# Patient Record
Sex: FEMALE | Race: BLACK OR AFRICAN AMERICAN | NOT HISPANIC OR LATINO | Employment: FULL TIME | URBAN - METROPOLITAN AREA
[De-identification: names, ages, dates, MRNs, and addresses within clinical notes are randomized per-mention and may not be internally consistent; named-entity substitution may affect disease eponyms.]

---

## 2023-03-29 DIAGNOSIS — L30.9 DERMATITIS: ICD-10-CM

## 2023-03-29 DIAGNOSIS — M25.519 SHOULDER PAIN, UNSPECIFIED CHRONICITY, UNSPECIFIED LATERALITY: Primary | ICD-10-CM

## 2023-03-29 DIAGNOSIS — E55.9 VITAMIN D DEFICIENCY: ICD-10-CM

## 2023-03-30 RX ORDER — IBUPROFEN 800 MG/1
TABLET ORAL
Qty: 90 TABLET | Refills: 0 | Status: SHIPPED | OUTPATIENT
Start: 2023-03-30

## 2023-03-30 RX ORDER — OMEGA-3S/DHA/EPA/FISH OIL/D3 300MG-1000
CAPSULE ORAL
Qty: 60 TABLET | Refills: 1 | Status: SHIPPED | OUTPATIENT
Start: 2023-03-30

## 2023-03-30 RX ORDER — DESONIDE 0.5 MG/G
OINTMENT TOPICAL
Qty: 15 G | Refills: 2 | Status: SHIPPED | OUTPATIENT
Start: 2023-03-30

## 2023-09-18 PROBLEM — T78.40XA ALLERGIC REACTION: Status: ACTIVE | Noted: 2023-09-18

## 2023-09-18 PROBLEM — M62.89 PELVIC FLOOR DYSFUNCTION: Status: ACTIVE | Noted: 2023-09-18

## 2023-09-18 PROBLEM — R39.9 UTI SYMPTOMS: Status: ACTIVE | Noted: 2023-09-18

## 2023-09-18 PROBLEM — L91.8 OTHER HYPERTROPHIC DISORDERS OF THE SKIN: Status: ACTIVE | Noted: 2022-05-19

## 2023-09-18 PROBLEM — S99.929A FOOT INJURY: Status: ACTIVE | Noted: 2023-09-18

## 2023-09-18 PROBLEM — K21.9 ACID REFLUX DISEASE: Status: ACTIVE | Noted: 2023-09-18

## 2023-09-18 PROBLEM — M77.8 TENDONITIS OF ELBOW, LEFT: Status: ACTIVE | Noted: 2023-09-18

## 2023-09-18 PROBLEM — M54.16 LUMBAR RADICULOPATHY, CHRONIC: Status: ACTIVE | Noted: 2023-09-18

## 2023-09-18 PROBLEM — M21.41 FLAT FEET, BILATERAL: Status: ACTIVE | Noted: 2023-09-18

## 2023-09-18 PROBLEM — R60.9 PERIPHERAL EDEMA: Status: ACTIVE | Noted: 2023-09-18

## 2023-09-18 PROBLEM — M17.0 OSTEOARTHRITIS OF KNEES, BILATERAL: Status: ACTIVE | Noted: 2023-09-18

## 2023-09-18 PROBLEM — L03.90 CELLULITIS: Status: ACTIVE | Noted: 2023-09-18

## 2023-09-18 PROBLEM — M17.12 ARTHRITIS OF KNEE, LEFT: Status: ACTIVE | Noted: 2023-09-18

## 2023-09-18 PROBLEM — S63.259A FINGER DISLOCATION: Status: ACTIVE | Noted: 2023-09-18

## 2023-09-18 PROBLEM — R60.0 PERIPHERAL EDEMA: Status: ACTIVE | Noted: 2023-09-18

## 2023-09-18 PROBLEM — T78.3XXS: Status: ACTIVE | Noted: 2023-09-18

## 2023-09-18 PROBLEM — H52.13 MYOPIA OF BOTH EYES: Status: ACTIVE | Noted: 2023-09-18

## 2023-09-18 PROBLEM — M21.42 FLAT FEET, BILATERAL: Status: ACTIVE | Noted: 2023-09-18

## 2023-09-18 PROBLEM — R11.0 NAUSEA IN ADULT: Status: ACTIVE | Noted: 2023-09-18

## 2023-09-18 PROBLEM — N81.6 RECTOCELE, FEMALE: Status: ACTIVE | Noted: 2023-09-18

## 2023-09-18 PROBLEM — F43.10 PTSD (POST-TRAUMATIC STRESS DISORDER): Status: ACTIVE | Noted: 2023-09-18

## 2023-09-18 PROBLEM — M76.62 ACHILLES TENDINITIS OF LEFT LOWER EXTREMITY: Status: ACTIVE | Noted: 2023-09-18

## 2023-09-18 PROBLEM — F41.9 ANXIETY: Status: ACTIVE | Noted: 2023-09-18

## 2023-09-18 PROBLEM — K64.4 EXTERNAL HEMORRHOID: Status: ACTIVE | Noted: 2023-09-18

## 2023-09-18 PROBLEM — R22.30: Status: ACTIVE | Noted: 2023-09-18

## 2023-09-18 PROBLEM — D48.5 NEOPLASM OF UNCERTAIN BEHAVIOR OF SKIN OF EYELID: Status: ACTIVE | Noted: 2022-05-19

## 2023-09-18 PROBLEM — R35.0 URINE FREQUENCY: Status: ACTIVE | Noted: 2023-09-18

## 2023-09-18 PROBLEM — R10.2 PELVIC PAIN IN FEMALE: Status: ACTIVE | Noted: 2023-09-18

## 2023-09-18 PROBLEM — M99.03 SEGMENTAL AND SOMATIC DYSFUNCTION OF LUMBAR REGION: Status: ACTIVE | Noted: 2023-09-18

## 2023-09-18 PROBLEM — E66.01 MORBID OBESITY (MULTI): Status: ACTIVE | Noted: 2023-09-18

## 2023-09-18 PROBLEM — M41.9 SCOLIOSIS: Status: ACTIVE | Noted: 2023-09-18

## 2023-09-18 PROBLEM — M54.17 LUMBOSACRAL RADICULITIS: Status: ACTIVE | Noted: 2023-09-18

## 2023-09-18 PROBLEM — N81.89 PELVIC FLOOR WEAKNESS: Status: ACTIVE | Noted: 2023-09-18

## 2023-09-18 PROBLEM — M17.30 POST-TRAUMATIC OSTEOARTHRITIS OF ONE KNEE: Status: ACTIVE | Noted: 2023-09-18

## 2023-09-18 PROBLEM — H25.813 COMBINED FORM OF SENILE CATARACT OF BOTH EYES: Status: ACTIVE | Noted: 2023-09-18

## 2023-09-18 PROBLEM — R32 URINARY INCONTINENCE: Status: ACTIVE | Noted: 2023-09-18

## 2023-09-18 PROBLEM — E55.9 VITAMIN D DEFICIENCY: Status: ACTIVE | Noted: 2023-09-18

## 2023-09-18 PROBLEM — M75.81 TENDINITIS OF RIGHT ROTATOR CUFF: Status: ACTIVE | Noted: 2023-09-18

## 2023-09-18 PROBLEM — S92.352A CLOSED DISPLACED FRACTURE OF FIFTH METATARSAL BONE OF LEFT FOOT: Status: ACTIVE | Noted: 2023-09-18

## 2023-09-18 PROBLEM — M65.312 TRIGGER FINGER OF LEFT THUMB: Status: ACTIVE | Noted: 2023-09-18

## 2023-09-18 PROBLEM — R63.2 POLYPHAGIA: Status: ACTIVE | Noted: 2023-09-18

## 2023-09-18 PROBLEM — E88.810 METABOLIC SYNDROME: Status: ACTIVE | Noted: 2023-09-18

## 2023-09-18 PROBLEM — G47.00 INSOMNIA: Status: ACTIVE | Noted: 2023-09-18

## 2023-09-18 PROBLEM — R04.2 COUGH WITH HEMOPTYSIS: Status: ACTIVE | Noted: 2023-09-18

## 2023-09-18 PROBLEM — I10 ESSENTIAL HYPERTENSION: Status: ACTIVE | Noted: 2023-09-18

## 2023-09-18 PROBLEM — Z91.199 NO-SHOW FOR APPOINTMENT: Status: ACTIVE | Noted: 2023-09-18

## 2023-09-18 PROBLEM — R40.0 DAYTIME SLEEPINESS: Status: ACTIVE | Noted: 2023-09-18

## 2023-09-18 PROBLEM — M47.816 LUMBAR SPONDYLOSIS: Status: ACTIVE | Noted: 2023-09-18

## 2023-09-18 PROBLEM — E11.9 TYPE 2 DIABETES MELLITUS WITHOUT COMPLICATIONS (MULTI): Status: ACTIVE | Noted: 2023-09-18

## 2023-09-18 PROBLEM — R29.818 SUSPECTED SLEEP APNEA: Status: ACTIVE | Noted: 2023-09-18

## 2023-09-18 PROBLEM — E11.9 DIABETES MELLITUS (MULTI): Status: ACTIVE | Noted: 2023-09-18

## 2023-09-18 PROBLEM — S83.249A ACUTE MEDIAL MENISCUS TEAR: Status: ACTIVE | Noted: 2023-09-18

## 2023-09-18 PROBLEM — N63.0 BREAST MASS IN FEMALE: Status: ACTIVE | Noted: 2023-09-18

## 2023-09-18 PROBLEM — M17.11 OSTEOARTHRITIS OF RIGHT KNEE: Status: ACTIVE | Noted: 2023-09-18

## 2023-09-18 PROBLEM — R79.89 LOW VITAMIN D LEVEL: Status: ACTIVE | Noted: 2023-09-18

## 2023-09-18 PROBLEM — R92.8 ABNORMAL MAMMOGRAM: Status: ACTIVE | Noted: 2023-09-18

## 2023-09-18 PROBLEM — N39.41 URGE INCONTINENCE: Status: ACTIVE | Noted: 2023-09-18

## 2023-09-18 PROBLEM — K64.4 ANAL SKIN TAG: Status: ACTIVE | Noted: 2023-09-18

## 2023-09-18 RX ORDER — ACETAMINOPHEN 500 MG
2 TABLET ORAL EVERY 8 HOURS PRN
COMMUNITY
Start: 2022-04-19 | End: 2024-01-26 | Stop reason: ALTCHOICE

## 2023-09-18 RX ORDER — CEPHALEXIN 500 MG/1
CAPSULE ORAL
COMMUNITY
Start: 2022-12-16 | End: 2024-01-19 | Stop reason: ALTCHOICE

## 2023-09-18 RX ORDER — DOCUSATE SODIUM 100 MG/1
CAPSULE, LIQUID FILLED ORAL
COMMUNITY
Start: 2021-06-29 | End: 2024-01-19 | Stop reason: ALTCHOICE

## 2023-09-18 RX ORDER — DULAGLUTIDE 0.75 MG/.5ML
INJECTION, SOLUTION SUBCUTANEOUS
COMMUNITY
Start: 2021-08-18 | End: 2024-01-19 | Stop reason: ALTCHOICE

## 2023-09-18 RX ORDER — BLOOD SUGAR DIAGNOSTIC
STRIP MISCELLANEOUS
COMMUNITY
End: 2024-01-10 | Stop reason: SDUPTHER

## 2023-09-18 RX ORDER — ALBUTEROL SULFATE 90 UG/1
2 AEROSOL, METERED RESPIRATORY (INHALATION) EVERY 4 HOURS PRN
COMMUNITY
Start: 2023-03-29

## 2023-09-18 RX ORDER — LANCETS 30 GAUGE
EACH MISCELLANEOUS
COMMUNITY
Start: 2023-01-23 | End: 2024-02-07 | Stop reason: WASHOUT

## 2023-09-18 RX ORDER — OXYCODONE HYDROCHLORIDE 5 MG/1
TABLET ORAL
COMMUNITY
Start: 2022-12-16 | End: 2024-01-19 | Stop reason: ALTCHOICE

## 2023-09-18 RX ORDER — METFORMIN HYDROCHLORIDE 500 MG/1
TABLET ORAL
COMMUNITY
End: 2024-01-19 | Stop reason: ALTCHOICE

## 2023-09-18 RX ORDER — DICLOFENAC SODIUM 75 MG/1
1 TABLET, DELAYED RELEASE ORAL 2 TIMES DAILY
COMMUNITY
Start: 2023-01-19 | End: 2024-01-19

## 2023-09-18 RX ORDER — HYDROCHLOROTHIAZIDE 25 MG/1
1 TABLET ORAL DAILY
COMMUNITY
Start: 2022-10-21 | End: 2024-02-07 | Stop reason: WASHOUT

## 2023-09-18 RX ORDER — EPINEPHRINE 0.3 MG/.3ML
0.3 INJECTION SUBCUTANEOUS
COMMUNITY
Start: 2021-05-12 | End: 2024-01-26 | Stop reason: SDUPTHER

## 2023-09-18 RX ORDER — DICLOFENAC SODIUM 100 MG/1
100 TABLET, EXTENDED RELEASE ORAL DAILY PRN
COMMUNITY
Start: 2022-11-01 | End: 2024-01-19

## 2023-09-18 RX ORDER — AMLODIPINE BESYLATE 5 MG/1
TABLET ORAL
COMMUNITY
Start: 2022-04-03 | End: 2024-01-19 | Stop reason: ALTCHOICE

## 2023-09-18 RX ORDER — HYDROCHLOROTHIAZIDE 12.5 MG/1
12.5 CAPSULE ORAL DAILY
COMMUNITY
Start: 2023-03-28 | End: 2024-01-26 | Stop reason: WASHOUT

## 2023-09-18 RX ORDER — CHLORHEXIDINE GLUCONATE ORAL RINSE 1.2 MG/ML
SOLUTION DENTAL
COMMUNITY
Start: 2023-01-19 | End: 2024-02-07 | Stop reason: WASHOUT

## 2023-09-18 RX ORDER — TRIAMCINOLONE ACETONIDE 1 MG/G
CREAM TOPICAL
COMMUNITY
Start: 2023-01-12

## 2023-09-18 RX ORDER — CHOLECALCIFEROL (VITAMIN D3) 125 MCG
2 TABLET ORAL DAILY
COMMUNITY
Start: 2022-08-17

## 2023-10-18 ENCOUNTER — ALLIED HEALTH (OUTPATIENT)
Dept: INTEGRATIVE MEDICINE | Facility: CLINIC | Age: 53
End: 2023-10-18
Payer: MEDICAID

## 2023-10-18 DIAGNOSIS — M99.04 SACROILIAC JOINT SOMATIC DYSFUNCTION: ICD-10-CM

## 2023-10-18 DIAGNOSIS — M54.17 LUMBOSACRAL RADICULITIS: ICD-10-CM

## 2023-10-18 DIAGNOSIS — M99.03 SEGMENTAL AND SOMATIC DYSFUNCTION OF LUMBAR REGION: Primary | ICD-10-CM

## 2023-10-18 DIAGNOSIS — M47.816 LUMBAR SPONDYLOSIS: ICD-10-CM

## 2023-10-18 DIAGNOSIS — M99.02 SOMATIC DYSFUNCTION OF THORACIC REGION: ICD-10-CM

## 2023-10-18 PROCEDURE — 98941 CHIROPRACT MANJ 3-4 REGIONS: CPT | Performed by: CHIROPRACTOR

## 2023-10-18 ASSESSMENT — ENCOUNTER SYMPTOMS
JOINT SWELLING: 0
FREQUENCY: 0
DIARRHEA: 0
CHEST TIGHTNESS: 0
FATIGUE: 0
CONFUSION: 0
FEVER: 0
TROUBLE SWALLOWING: 0
CONSTIPATION: 0
ABDOMINAL PAIN: 0

## 2023-10-18 NOTE — PROGRESS NOTES
Subjective   Patient ID: Nupur Ramires is a 53 y.o. female who presents today for low back pain.    13/15 [facility]    HPI -the patient reports that she continues to experience frequent to constant left-sided lumbosacral and gluteal pain.  While it does remain painful she continues to report that it is less severe than before and she remains much more functional since starting care.  She reports that, unfortunately, she had to postpone her knee procedure based on insurance and co-pay concerns.  She has a follow-up next week with orthopedics in effort to essentially restart the process.    Review of Systems   Constitutional:  Negative for fatigue and fever.   HENT:  Negative for trouble swallowing.    Eyes:  Negative for visual disturbance.   Respiratory:  Negative for chest tightness.    Cardiovascular:  Negative for chest pain and leg swelling.   Gastrointestinal:  Negative for abdominal pain, constipation and diarrhea.   Genitourinary:  Negative for frequency.   Musculoskeletal:  Negative for joint swelling.   Neurological:  Negative for syncope.   Psychiatric/Behavioral:  Negative for confusion.    All other systems reviewed and are negative.      Relevant Imaging:    Objective     The patient is alert and oriented x 3. FHC.  Cranial nerves II-XII are grossly intact. No difficulty with transitional movements is observed.  Rounded shoulders.  Increased thoracic kyphosis. Elevated right iliac crest.  Leg length is symmetric.  Mild antalgic gait.    Moderatehypertonicity and tenderness is present in the rhomboids,  thoracic paraspinals, lumbar paraspinals, quadratus lumborum, upper gluteals, piriformis, left greater than right.  There appears to be a lipoma present in the left paraspinal musculature.    Segmental joint dysfunction was assessed with motion palpation and is identified in the following areas:  Cervical:   Thoracic: T4/5, T5/6  Lumbopelvic: L4/5, L5/S1 Left SI    Assessment/Plan   Deficits to remain as  relates to the lumbar spine and sacroiliac region, with patient is much better than initial presentation both subjectively and from a functional perspective    (07/18/2022 CR: The patient's initial presentation is consistent with lumbar spondylosis, lumbosacral radiculitis, and mechanical joint dysfunction. Her condition is complicated by her idiopathic scoliosis. Based on the chronicity of her complaints I do feel that a trial of acupuncture alongside chiropractic care will help optimize the patient's outcome.)     Today's treatment:  Drop-table manipulation applied to the: left SI joint.  Flexion-distraction applied to the lumbar spine  Diversified manipulation applied to the involved thoracic segments    Neuromuscular re-education: IDN applied to lumbar paraspinals and left gluteals (7 in, 7 out).  7 minutes.    Treatment Plan:   The patient tolerated today's treatment with little or no additional discomfort and was instructed to contact the office for questions or concerns. Return in about one month.    Please note: Voice to text software was used when completing this note. While the note was proofread, portions may include grammatical errors. Please contact me with any questions/concerns as it relates to these types of errors.

## 2023-10-25 ENCOUNTER — OFFICE VISIT (OUTPATIENT)
Dept: ORTHOPEDIC SURGERY | Facility: HOSPITAL | Age: 53
End: 2023-10-25
Payer: MEDICAID

## 2023-10-25 DIAGNOSIS — M17.0 OSTEOARTHRITIS OF BOTH KNEES, UNSPECIFIED OSTEOARTHRITIS TYPE: Primary | ICD-10-CM

## 2023-10-25 PROCEDURE — 99213 OFFICE O/P EST LOW 20 MIN: CPT | Performed by: PHYSICIAN ASSISTANT

## 2023-10-25 PROCEDURE — 2500000004 HC RX 250 GENERAL PHARMACY W/ HCPCS (ALT 636 FOR OP/ED): Performed by: PHYSICIAN ASSISTANT

## 2023-10-25 PROCEDURE — 2500000005 HC RX 250 GENERAL PHARMACY W/O HCPCS: Performed by: PHYSICIAN ASSISTANT

## 2023-10-25 PROCEDURE — 3008F BODY MASS INDEX DOCD: CPT | Performed by: PHYSICIAN ASSISTANT

## 2023-10-25 PROCEDURE — 20610 DRAIN/INJ JOINT/BURSA W/O US: CPT | Mod: 50 | Performed by: PHYSICIAN ASSISTANT

## 2023-10-25 PROCEDURE — 3051F HG A1C>EQUAL 7.0%<8.0%: CPT | Performed by: PHYSICIAN ASSISTANT

## 2023-10-25 RX ORDER — METHYLPREDNISOLONE ACETATE 40 MG/ML
40 INJECTION, SUSPENSION INTRA-ARTICULAR; INTRALESIONAL; INTRAMUSCULAR; SOFT TISSUE
Status: COMPLETED | OUTPATIENT
Start: 2023-10-25 | End: 2023-10-25

## 2023-10-25 RX ORDER — LIDOCAINE HYDROCHLORIDE 10 MG/ML
4 INJECTION, SOLUTION EPIDURAL; INFILTRATION; INTRACAUDAL; PERINEURAL
Status: COMPLETED | OUTPATIENT
Start: 2023-10-25 | End: 2023-10-25

## 2023-10-25 RX ADMIN — METHYLPREDNISOLONE ACETATE 40 MG: 40 INJECTION, SUSPENSION INTRA-ARTICULAR; INTRALESIONAL; INTRAMUSCULAR; INTRASYNOVIAL; SOFT TISSUE at 12:55

## 2023-10-25 RX ADMIN — LIDOCAINE HYDROCHLORIDE 4 ML: 10 INJECTION, SOLUTION EPIDURAL; INFILTRATION; INTRACAUDAL; PERINEURAL at 12:55

## 2023-10-25 ASSESSMENT — PAIN SCALES - GENERAL: PAINLEVEL_OUTOF10: 4

## 2023-10-25 ASSESSMENT — PAIN - FUNCTIONAL ASSESSMENT: PAIN_FUNCTIONAL_ASSESSMENT: 0-10

## 2023-10-25 NOTE — PROGRESS NOTES
Patient is here for bilateral knee injections.      Patient here today regarding Bilateral hip pain.  They have known osteoarthritis.   Imaging was again reviewed.   They have responded well injections in the past and their last injection was about 5 months ago.  She is planning on TKA and will set up an consultation for this.   There is no effusion on exam., 2+ patellofemoral crepitus.  They would like to repeat the injection today and I think that is reasonable.  We proceeded as below.       Olga Flores PA-C     Patient ID: Nupur Ramires is a 53 y.o. female.    L Inj/Asp: bilateral knee on 10/25/2023 12:55 PM  Indications: pain  Details: 22 G needle, anterior approach  Medications (Right): 40 mg methylPREDNISolone acetate 40 mg/mL; 4 mL lidocaine PF 10 mg/mL (1 %)  Medications (Left): 40 mg methylPREDNISolone acetate 40 mg/mL; 4 mL lidocaine PF 10 mg/mL (1 %)    Under sterile conditions the left and right knee were injected with 4cc of lidocaine 40mg DepoMedrol.  The patient tolerated the procedure well.  There were no apparent complications.  Sterile bandage was applied.  Procedure, treatment alternatives, risks and benefits explained, specific risks discussed. Consent was given by the patient. Immediately prior to procedure a time out was called to verify the correct patient, procedure, equipment, support staff and site/side marked as required. Patient was prepped and draped in the usual sterile fashion.

## 2023-11-15 ENCOUNTER — ALLIED HEALTH (OUTPATIENT)
Dept: INTEGRATIVE MEDICINE | Facility: CLINIC | Age: 53
End: 2023-11-15
Payer: MEDICAID

## 2023-11-15 DIAGNOSIS — M54.17 LUMBOSACRAL RADICULITIS: ICD-10-CM

## 2023-11-15 DIAGNOSIS — M99.02 SOMATIC DYSFUNCTION OF THORACIC REGION: ICD-10-CM

## 2023-11-15 DIAGNOSIS — M99.03 SEGMENTAL AND SOMATIC DYSFUNCTION OF LUMBAR REGION: Primary | ICD-10-CM

## 2023-11-15 DIAGNOSIS — M47.816 LUMBAR SPONDYLOSIS: ICD-10-CM

## 2023-11-15 DIAGNOSIS — M99.04 SACROILIAC JOINT SOMATIC DYSFUNCTION: ICD-10-CM

## 2023-11-15 PROCEDURE — 98941 CHIROPRACT MANJ 3-4 REGIONS: CPT | Performed by: CHIROPRACTOR

## 2023-11-15 ASSESSMENT — ENCOUNTER SYMPTOMS
CONSTIPATION: 0
JOINT SWELLING: 0
TROUBLE SWALLOWING: 0
CONFUSION: 0
CHEST TIGHTNESS: 0
DIARRHEA: 0
FREQUENCY: 0
FEVER: 0
ABDOMINAL PAIN: 0
FATIGUE: 0

## 2023-11-15 NOTE — PROGRESS NOTES
Subjective   Patient ID: Nupur Ramires is a 53 y.o. female who presents today for low back pain.    14/15 [facility]    HPI -the patient is reporting that she is experiencing soreness in the lumbar spine as well as in the upper trapezius muscles bilaterally today.  She reports that she swam for an extended period yesterday and feels like the soreness is a result of that.  She was able to get reestablished with orthopedics for the knee and had an injection of time between her last appointment with me and today.    Review of Systems   Constitutional:  Negative for fatigue and fever.   HENT:  Negative for trouble swallowing.    Eyes:  Negative for visual disturbance.   Respiratory:  Negative for chest tightness.    Cardiovascular:  Negative for chest pain and leg swelling.   Gastrointestinal:  Negative for abdominal pain, constipation and diarrhea.   Genitourinary:  Negative for frequency.   Musculoskeletal:  Negative for joint swelling.   Neurological:  Negative for syncope.   Psychiatric/Behavioral:  Negative for confusion.    All other systems reviewed and are negative.      Relevant Imaging:    Objective     The patient is alert and oriented x 3. FHC.  Cranial nerves II-XII are grossly intact. Mild difficulty with transitional movements is observed.  Rounded shoulders.  Increased thoracic kyphosis. Elevated right iliac crest.  Leg length is symmetric.  Mild antalgic gait.    Moderatehypertonicity and tenderness is present in the upper trapezii, rhomboids,  thoracic paraspinals, lumbar paraspinals, quadratus lumborum, upper gluteals, piriformis, left greater than right.  There appears to be a lipoma present in the left paraspinal musculature.    Segmental joint dysfunction was assessed with motion palpation and is identified in the following areas:  Cervical:   Thoracic: T4/5, T5/6  Lumbopelvic: L4/5, L5/S1 Left SI    Assessment/Plan   (Deficits to remain as relates to the lumbar spine and sacroiliac region, with  patient is much better than initial presentation both subjectively and from a functional perspective.)    (07/18/2022 CR: The patient's initial presentation is consistent with lumbar spondylosis, lumbosacral radiculitis, and mechanical joint dysfunction. Her condition is complicated by her idiopathic scoliosis. Based on the chronicity of her complaints I do feel that a trial of acupuncture alongside chiropractic care will help optimize the patient's outcome.)     Today's treatment:  Drop-table manipulation applied to the: left SI joint.  Flexion-distraction applied to the lumbar spine  Diversified manipulation applied to the involved thoracic segments    Neuromuscular re-education: IDN applied to upper trapezii, lumbar paraspinals and left gluteals (9 in, 9 out).  7 minutes.    Treatment Plan:   The patient tolerated today's treatment with little or no additional discomfort and was instructed to contact the office for questions or concerns. Return in about one month.    Please note: Voice to text software was used when completing this note. While the note was proofread, portions may include grammatical errors. Please contact me with any questions/concerns as it relates to these types of errors.

## 2023-12-13 ENCOUNTER — ALLIED HEALTH (OUTPATIENT)
Dept: INTEGRATIVE MEDICINE | Facility: CLINIC | Age: 53
End: 2023-12-13
Payer: MEDICAID

## 2023-12-13 DIAGNOSIS — M47.816 LUMBAR SPONDYLOSIS: ICD-10-CM

## 2023-12-13 DIAGNOSIS — M54.17 LUMBOSACRAL RADICULITIS: ICD-10-CM

## 2023-12-13 DIAGNOSIS — M99.03 SEGMENTAL AND SOMATIC DYSFUNCTION OF LUMBAR REGION: Primary | ICD-10-CM

## 2023-12-13 DIAGNOSIS — M99.04 SACROILIAC JOINT SOMATIC DYSFUNCTION: ICD-10-CM

## 2023-12-13 DIAGNOSIS — M99.02 SOMATIC DYSFUNCTION OF THORACIC REGION: ICD-10-CM

## 2023-12-13 PROCEDURE — 98941 CHIROPRACT MANJ 3-4 REGIONS: CPT | Performed by: CHIROPRACTOR

## 2023-12-13 ASSESSMENT — ENCOUNTER SYMPTOMS
FEVER: 0
TROUBLE SWALLOWING: 0
CHEST TIGHTNESS: 0
FATIGUE: 0
ABDOMINAL PAIN: 0
JOINT SWELLING: 0
CONFUSION: 0
CONSTIPATION: 0
FREQUENCY: 0
DIARRHEA: 0

## 2023-12-13 NOTE — PROGRESS NOTES
Subjective   Patient ID: Nupur Ramires is a 53 y.o. female who presents today for low back pain.    14/15 [facility]    HPI - The patient is reporting that she has been doing more around the house lately which has led to increased pain in the lower back and SI region.  Activities like gardening and cooking are provided as the activities which were provocative.    Review of Systems   Constitutional:  Negative for fatigue and fever.   HENT:  Negative for trouble swallowing.    Eyes:  Negative for visual disturbance.   Respiratory:  Negative for chest tightness.    Cardiovascular:  Negative for chest pain and leg swelling.   Gastrointestinal:  Negative for abdominal pain, constipation and diarrhea.   Genitourinary:  Negative for frequency.   Musculoskeletal:  Negative for joint swelling.   Neurological:  Negative for syncope.   Psychiatric/Behavioral:  Negative for confusion.    All other systems reviewed and are negative.      Relevant Imaging:    Objective     The patient is alert and oriented x 3. FHC.  Cranial nerves II-XII are grossly intact. Mild difficulty with transitional movements is observed.  Rounded shoulders.  Increased thoracic kyphosis. Elevated right iliac crest.  Leg length is symmetric.  Mild antalgic gait.    Moderatehypertonicity and tenderness is present in the upper trapezii, rhomboids,  thoracic paraspinals, lumbar paraspinals, quadratus lumborum, upper gluteals, piriformis, left greater than right.    (There appears to be a lipoma present in the left paraspinal musculature.)    Segmental joint dysfunction was assessed with motion palpation and is identified in the following areas:  Cervical:   Thoracic: T4/5, T5/6  Lumbopelvic: L4/5, L5/S1 Left SI > Right SI    Assessment/Plan   (Deficits to remain as relates to the lumbar spine and sacroiliac region, with patient is much better than initial presentation both subjectively and from a functional perspective.)    (07/18/2022 CR: The patient's initial  presentation is consistent with lumbar spondylosis, lumbosacral radiculitis, and mechanical joint dysfunction. Her condition is complicated by her idiopathic scoliosis. Based on the chronicity of her complaints I do feel that a trial of acupuncture alongside chiropractic care will help optimize the patient's outcome.)     Today's treatment:  Drop-table manipulation applied to the: left SI joint.  Flexion-distraction applied to the lumbar spine  Diversified manipulation applied to the involved thoracic segments    Neuromuscular re-education: IDN applied to upper trapezii, lumbar paraspinals and bilateral gluteals (8 in, 8 out).  7 minutes.    Treatment Plan:   The patient tolerated today's treatment with little or no additional discomfort and was instructed to contact the office for questions or concerns. Return in about one month.    Please note: Voice to text software was used when completing this note. While the note was proofread, portions may include grammatical errors. Please contact me with any questions/concerns as it relates to these types of errors.

## 2024-01-10 ENCOUNTER — HOSPITAL ENCOUNTER (OUTPATIENT)
Dept: RADIOLOGY | Facility: EXTERNAL LOCATION | Age: 54
Discharge: HOME | End: 2024-01-10

## 2024-01-10 DIAGNOSIS — E11.9 TYPE 2 DIABETES MELLITUS WITHOUT COMPLICATION, UNSPECIFIED WHETHER LONG TERM INSULIN USE (MULTI): ICD-10-CM

## 2024-01-10 RX ORDER — BLOOD SUGAR DIAGNOSTIC
STRIP MISCELLANEOUS
Qty: 200 STRIP | Refills: 0 | Status: SHIPPED | OUTPATIENT
Start: 2024-01-10 | End: 2024-02-07 | Stop reason: WASHOUT

## 2024-01-11 ENCOUNTER — OFFICE VISIT (OUTPATIENT)
Dept: ORTHOPEDIC SURGERY | Facility: CLINIC | Age: 54
End: 2024-01-11
Payer: MEDICAID

## 2024-01-11 DIAGNOSIS — M76.822 POSTERIOR TIBIAL TENDINITIS OF LEFT LOWER EXTREMITY: ICD-10-CM

## 2024-01-11 DIAGNOSIS — M79.672 PAIN OF LEFT HEEL: ICD-10-CM

## 2024-01-11 PROCEDURE — 99203 OFFICE O/P NEW LOW 30 MIN: CPT

## 2024-01-11 RX ORDER — METHYLPREDNISOLONE 4 MG/1
4 TABLET ORAL ONCE
Qty: 1 TABLET | Refills: 0 | Status: SHIPPED | OUTPATIENT
Start: 2024-01-11 | End: 2024-01-11

## 2024-01-11 NOTE — LETTER
January 11, 2024     Patient: Nupur Ramires   YOB: 1970   Date of Visit: 1/11/2024       To Whom It May Concern:    It is my medical opinion that Nupur Ramires should remain out of work until 1/22/2024 .    If you have any questions or concerns, please don't hesitate to call.         Sincerely,        Emily Kay, APRN-CNP    CC: No Recipients

## 2024-01-11 NOTE — PROGRESS NOTES
"Subjective    Patient ID: Nupur Ramires is a 53 y.o. female.    Chief Complaint: OTHER (LT HEEL PAIN FOR 1 WEEK./[facility].)    HPI  53-year-old female is seen today for left posterior heel/ankle pain.  This has been ongoing for approximately 1 week without any known injury.  Patient states that she has suffered from planter fasciitis in the past and is known to have \"flatfeet\".  She states that Sunday she was seen at [facility], where multiple view x-rays of her left foot were obtained without any evidence of acute fracture or dislocation, but there was noted to be a calcaneal spur.  She was placed in a tall walking boot and advised to weight-bear as tolerated.  She was advised follow-up with a podiatrist, but the earliest appointment is in April, which she has scheduled.  She has not noticed any significant bruising or swelling.  States that it is difficult for her to fully weight-bear on left lower extremity when outside of the boot.  She was prescribed naproxen, but states that it has been giving her GI upset nausea, therefore has transition to ibuprofen.  Denies numbness and paresthesia of left foot and ankle.  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  She has had significant weight loss over the last year, and is trying to remain active, swimming at least 3 times a week at the local recreational pool.    The patient's past medical, surgical, family, and social history as well as allergies and medications were reviewed and updated in the chart.    Objective   Ortho Exam  Pleasant in no acute distress.  Walks in the office today with a mildly antalgic gait with use of a tall walking boot.  Left foot appearing with minimal soft tissue swelling noted to posterior aspect of lateral malleolus.  She is tender upon palpation of this area.  Stands with pes planus.  Unable to do single toe raise.  She has full range of motion of left foot and ankle in terms of inversion eversion dorsiflexion and " plantarflexion with minimal discomfort.  She has adequate strength with resisted dorsiflexion and plantarflexion.  The ankle joint is stable.  Sensation is intact to light touch.    Image Results:  XR transfer of outside films  Outside images for comparison or treatment purposes, not interpreted by    Radiologists.    Multiple view x-rays of the left foot obtained at [facility] on Sunday, January 7, 2023 personally reviewed.  There is no evidence of acute fracture or dislocation.  There is noted to be a calcaneal spur on lateral view.  Mild degenerative changes throughout the left foot were noted.    Assessment/Plan   Encounter Diagnoses:  Pain of left heel    Posterior tibial tendinitis of left lower extremity    Plan: Discussion with patient about differential diagnoses with review of x-rays.  Conservative treatment options were discussed at length.  Patient will continue with tall walking boot for at least 2 weeks, and then wean out of boot as tolerated.  She can weight-bear as tolerated.  I have provided patient a Medrol Dosepak to help decrease pain and inflammation, and she is aware not to take ibuprofen or other anti-inflammatories while on Medrol Dosepak, but can supplement with Tylenol.  She can apply ice to affected area.  She will follow-up with podiatry.  She was also given the name of [place] [facility] to see if she can get an earlier appointment.  She will be out of work until January 22, 2024.  She can follow-up as symptoms dictate.    Orders Placed This Encounter    methylPREDNISolone (Medrol Dospak) 4 mg tablets

## 2024-01-15 ENCOUNTER — ALLIED HEALTH (OUTPATIENT)
Dept: INTEGRATIVE MEDICINE | Facility: CLINIC | Age: 54
End: 2024-01-15
Payer: MEDICAID

## 2024-01-15 DIAGNOSIS — M99.03 SEGMENTAL AND SOMATIC DYSFUNCTION OF LUMBAR REGION: ICD-10-CM

## 2024-01-15 DIAGNOSIS — M99.04 SACROILIAC JOINT SOMATIC DYSFUNCTION: ICD-10-CM

## 2024-01-15 DIAGNOSIS — I10 ESSENTIAL HYPERTENSION: Primary | ICD-10-CM

## 2024-01-15 DIAGNOSIS — M47.816 LUMBAR SPONDYLOSIS: ICD-10-CM

## 2024-01-15 DIAGNOSIS — M99.02 SOMATIC DYSFUNCTION OF THORACIC REGION: ICD-10-CM

## 2024-01-15 DIAGNOSIS — M54.17 LUMBOSACRAL RADICULITIS: ICD-10-CM

## 2024-01-15 PROCEDURE — 98941 CHIROPRACT MANJ 3-4 REGIONS: CPT | Performed by: CHIROPRACTOR

## 2024-01-15 ASSESSMENT — ENCOUNTER SYMPTOMS
JOINT SWELLING: 0
FATIGUE: 0
CONSTIPATION: 0
TROUBLE SWALLOWING: 0
FEVER: 0
ABDOMINAL PAIN: 0
FREQUENCY: 0
CHEST TIGHTNESS: 0
CONFUSION: 0
DIARRHEA: 0

## 2024-01-15 NOTE — PROGRESS NOTES
Subjective   Patient ID: Nupur Ramires is a 53 y.o. female who presents today for low back pain.    1/15 [facility]    HPI -the patient is reporting that she continues to experience frequent to constant lower back and lumbosacral sacral pain.  Her symptoms are greater on the left.  She had recent trips to the emergency department for left foot pain as well as epi taxis.  She was diagnosed with heel spurs on the left foot and does have upcoming podiatric consults.  Her hypertension has been out of control which has led to the nosebleeds.  She does take medication for this, but is still looking for root cause and is inquiring about any functional medicine/integrative medicine advisement.    Review of Systems   Constitutional:  Negative for fatigue and fever.   HENT:  Negative for trouble swallowing.    Eyes:  Negative for visual disturbance.   Respiratory:  Negative for chest tightness.    Cardiovascular:  Negative for chest pain and leg swelling.   Gastrointestinal:  Negative for abdominal pain, constipation and diarrhea.   Genitourinary:  Negative for frequency.   Musculoskeletal:  Negative for joint swelling.   Neurological:  Negative for syncope.   Psychiatric/Behavioral:  Negative for confusion.    All other systems reviewed and are negative.      Relevant Imaging:    Objective     The patient is alert and oriented x 3. FHC.  Cranial nerves II-XII are grossly intact. Mild difficulty with transitional movements is observed.  Rounded shoulders.  Increased thoracic kyphosis. Elevated right iliac crest.  Leg length is symmetric.  Antalgic gait.    Moderate hypertonicity and tenderness is present in the upper trapezii, rhomboids,  thoracic paraspinals, lumbar paraspinals, quadratus lumborum, upper gluteals, piriformis, left greater than right.    (There appears to be a lipoma present in the left paraspinal musculature.)    Segmental joint dysfunction was assessed with motion palpation and is identified in the following  areas:  Cervical:   Thoracic: T4/5, T5/6  Lumbopelvic: L4/5, L5/S1 Left SI > Right SI    Assessment/Plan   Physical presentation is largely unchanged when compared to last visit.  When considering her hypertension concerns I have decided to place an order for integrative medicine consult.    (07/18/2022 CR: The patient's initial presentation is consistent with lumbar spondylosis, lumbosacral radiculitis, and mechanical joint dysfunction. Her condition is complicated by her idiopathic scoliosis. Based on the chronicity of her complaints I do feel that a trial of acupuncture alongside chiropractic care will help optimize the patient's outcome.)     Today's treatment:  Low force manipulation applied to the: left SI joint.  Flexion-distraction applied to the lumbar spine  Low force manipulation applied to the involved thoracic segments    Neuromuscular re-education: IDN applied to lumbar paraspinals and leftgluteals (6 in, 6 out).  7 minutes.    Treatment Plan:   The patient tolerated today's treatment with little or no additional discomfort and was instructed to contact the office for questions or concerns. Return in about one month.    Please note: Voice to text software was used when completing this note. While the note was proofread, portions may include grammatical errors. Please contact me with any questions/concerns as it relates to these types of errors.

## 2024-01-19 ENCOUNTER — OFFICE VISIT (OUTPATIENT)
Dept: OBSTETRICS AND GYNECOLOGY | Facility: CLINIC | Age: 54
End: 2024-01-19
Payer: MEDICAID

## 2024-01-19 VITALS
DIASTOLIC BLOOD PRESSURE: 82 MMHG | SYSTOLIC BLOOD PRESSURE: 195 MMHG | WEIGHT: 222 LBS | HEIGHT: 62 IN | BODY MASS INDEX: 40.85 KG/M2 | HEART RATE: 77 BPM

## 2024-01-19 DIAGNOSIS — N39.0 RECURRENT UTI: ICD-10-CM

## 2024-01-19 DIAGNOSIS — Z12.31 ENCOUNTER FOR SCREENING MAMMOGRAM FOR MALIGNANT NEOPLASM OF BREAST: Primary | ICD-10-CM

## 2024-01-19 LAB
POC APPEARANCE, URINE: CLEAR
POC BILIRUBIN, URINE: NEGATIVE
POC BLOOD, URINE: NEGATIVE
POC COLOR, URINE: NORMAL
POC GLUCOSE, URINE: NEGATIVE MG/DL
POC KETONES, URINE: NEGATIVE MG/DL
POC LEUKOCYTES, URINE: NEGATIVE
POC NITRITE,URINE: NEGATIVE
POC PH, URINE: 6 PH
POC PROTEIN, URINE: NEGATIVE MG/DL
POC SPECIFIC GRAVITY, URINE: 1.01
POC UROBILINOGEN, URINE: 0.2 EU/DL

## 2024-01-19 PROCEDURE — 3077F SYST BP >= 140 MM HG: CPT | Performed by: OBSTETRICS & GYNECOLOGY

## 2024-01-19 PROCEDURE — 3079F DIAST BP 80-89 MM HG: CPT | Performed by: OBSTETRICS & GYNECOLOGY

## 2024-01-19 PROCEDURE — 99213 OFFICE O/P EST LOW 20 MIN: CPT | Performed by: OBSTETRICS & GYNECOLOGY

## 2024-01-19 NOTE — PROGRESS NOTES
[facility] Department of Urogynecology   Silvina De Souza MD, MPH   [phone]    ASSESSMENT AND PLAN:   53 y.o. female presents for one year postop visit s/p excision of labial cyst on 12/9/22.     Diagnoses:   #1 Labial cyst, resolved   #2 Myofascial abdominal pain     Plan:   1. Labial cyst, resolved   - S/p excision of labial cyst on 12/9/22.   - She is well healed without issue.     2. Myofascial abdominal pain   - She can use heat, ice, massage, and Ibuprofen for pain management. If sxs do not improve, she would be referred to physical therapy.     3. General health   - BP = 195/82 today- retake at the end of the visit was 140/72. We reviewed she may have white coat HTN, which is caused by increased anxiety when coming to the doctor's.   - Patient was given a mammogram order.      Follow-up PRN with Dr. De Souza.     Scribe Attestation:   I, Joslyn Donahue, am scribing for virtually, and in the presence of Sivlina De Souza MD MPH on 1/19/24 at 11:11 AM.     Problem List Items Addressed This Visit    None  Visit Diagnoses       Encounter for screening mammogram for malignant neoplasm of breast    -  Primary    Relevant Orders    BI mammo bilateral screening tomosynthesis    Recurrent UTI        Relevant Orders    POCT UA Automated manually resulted (Completed)           I spent a total of 20 minutes in face to face and non face to face time.      Silvina De Souza MD, MPH, FACOG     I Dr. De Souza, personally performed the services described in the documentation as scribed in my presence and confirm it is both complete and accurate.  Silvina De Souza MD, MPH, FACOG      Established    HISTORY OF PRESENT ILLNESS:     Nupur Ramires is a 53 y.o. female who presents for one year postop visit. S/p excision of labial cyst on 12/9/22.     Record Review:   - 1/20/23 Dr. Silvina De Souza note reviewed: S/p excision of labial cyst on 12/9/22. She was healing well without issue. Asymptomatic POP - Decided to not treat  "as she had no sxs.     Postop Symptoms:  - Denies having any problems after her labial cyst excision.     Abdominal Symptoms:   - She feels like something is \"moving up inside\" of her lower R abdomen. She had a hernia repair years ago after having her child in .  She is swimming frequently and doing crunches often.   - Hernia was on the R side. Mesh was used with repair.    Sexual Activity:   - Not sexually active.     Interval History:  - Considering getting a wax; discussed it could cause ingrown hairs once hair grows back or the top layer of skin could be removed with the waxing.     Past Medical History:     - BP = 195/82. She had 2 ED visits for her BP starting on 24. She is no longer smoking and has lost weight, so she has been working on living healthier. She has been on Ozempic since 2023. She is almost at her BMI goal and will then qualify for tummy tuck surgery.      Past Medical History:   Diagnosis Date    Diverticulosis of intestine, part unspecified, without perforation or abscess without bleeding 08/15/2014    Diverticulosis    Personal history of other diseases of the digestive system 2014    History of diverticulitis of colon    Personal history of other diseases of the musculoskeletal system and connective tissue     Personal history of scoliosis          Past Surgical History:       Past Surgical History:   Procedure Laterality Date    ANTERIOR CRUCIATE LIGAMENT REPAIR  2014    Primary Repair Of Knee Ligament Cruciate Anterior     SECTION, CLASSIC  2014     Section    OTHER SURGICAL HISTORY  2014    Knee Arthroscopy With Medial Meniscectomy    OTHER SURGICAL HISTORY  2019    Hernia repair         Medications:       Prior to Admission medications    Medication Sig Start Date End Date Taking? Authorizing Provider   acetaminophen (Tylenol) 500 mg tablet Take 2 tablets (1,000 mg) by mouth every 8 hours if needed. 22  Yes Historical " Provider, MD   EPINEPHrine 0.3 mg/0.3 mL injection syringe Inject 0.3 mL (0.3 mg) into the muscle. 5/12/21  Yes Historical Provider, MD   ergocalciferol, vitamin D2, 50 mcg (2,000 unit) tablet Take 2 tablets by mouth once daily. 8/17/22  Yes Historical Provider, MD   hydroCHLOROthiazide (HYDRODiuril) 25 mg tablet Take 1 tablet (25 mg) by mouth once daily. 10/21/22  Yes Historical Provider, MD   hydroCHLOROthiazide (Microzide) 12.5 mg capsule Take 1 capsule (12.5 mg) by mouth once daily. 3/28/23  Yes Historical Provider, MD   ibuprofen 800 mg tablet TAKE ONE TABLET EVERY 8 HOURS AS NEEDED FOR PAIN 3/30/23  Yes DONALD Conde   OneTouch Ultra Test strip TEST 4 TIMES DAILY. 1/10/24  Yes Tito Cook MD   OneTouch Ultra2 Meter misc use as directed. 1/23/23  Yes Historical Provider, MD   semaglutide (Ozempic) 0.25 mg or 0.5 mg(2 mg/1.5 mL) pen injector Inject 0.25 mg under the skin once a week.   Yes Historical Provider, MD   triamcinolone (Kenalog) 0.1 % cream APPLY 1 APPLICATION TO AFFECTED AREA TWICE DAILY. 1/12/23  Yes Historical Provider, MD   Ventolin HFA 90 mcg/actuation inhaler Inhale 2 puffs every 4 hours if needed for wheezing or shortness of breath. 3/29/23  Yes Historical Provider, MD   Vitamin D3 50 mcg (2,000 unit) tablet TAKE 2 TABLET DAILY 3/30/23  Yes DONALD Conde   benzalkonium chloride 0.13 % towelette USE AS DIRECTED 1/18/23   Historical Provider, MD   chlorhexidine (Peridex) 0.12 % solution RINSE WITH 1/2 OUNCE BY MOUTH FOR 30 SECONDS THEN SPIT OUT. use twice a day 1/19/23   Historical Provider, MD   desonide (DesOwen) 0.05 % ointment APPLY SPARINGLY TO AFFECTED AREA(S) ON THE FACE DAILY FOR ITCHING AVOID USE IN THE EYES 3/30/23   DONALD Conde   amLODIPine (Norvasc) 5 mg tablet Take by mouth. 4/3/22 1/19/24  Historical Provider, MD   cephalexin (Keflex) 500 mg capsule TAKE 1 CAPSULE EVERY 8 HOURS UNTIL ALL TAKEN. 12/16/22 1/19/24  Historical Provider, MD  "  diclofenac (Voltaren) 75 mg EC tablet Take 1 tablet (75 mg) by mouth 2 times a day. 1/19/23 1/19/24  Historical Provider, MD   diclofenac sodium (Voltaren XR) 100 mg 24 hr tablet Take 1 tablet (100 mg) by mouth once daily as needed for pain. 11/1/22 1/19/24  Historical Provider, MD   docusate sodium (Colace) 100 mg capsule Take by mouth. 6/29/21 1/19/24  Historical Provider, MD   metFORMIN (Glucophage) 500 mg tablet TAKE 1 TABLET WITH BREAKFAST AND 1 TABLET WITH DINNER AS DIRECTED  1/19/24  Historical Provider, MD   oxyCODONE (Roxicodone) 5 mg immediate release tablet TAKE 1 TABLET EVERY 6 HOURS AS NEEDED FOR BREAKTHROUGH PAIN. 12/16/22 1/19/24  Historical Provider, MD Redity 0.75 mg/0.5 mL pen injector Inject under the skin. 8/18/21 1/19/24  Historical Provider, MD DELEON  Review of Systems       PHYSICAL EXAM:      BP (!) 195/82   Pulse 77   Ht 1.575 m (5' 2\")   Wt 101 kg (222 lb)   BMI 40.60 kg/m²      No LMP recorded.      Declines chaperone for physical exam.      Well developed, well nourished, in no apparent distress.   Neurologic/Psychiatric:  Awake, Alert and Oriented times 3.  Affect normal.     GENITAL/URINARY:       External Genitalia:  The patient has normal appearing external genitalia, normal skenes and bartholins glands, and a normal hair distribution.  Her vulva is without lesions, erythema or discharge.  It is non-tender with appropriate sensation.     Urethral Meatus:  Size normal, Location normal, Lesions absent, Prolapse absent,      Urethra:  Fullness absent, Masses absent,      Vagina:  General appearance normal, Discharge absent, Lesions absent,      Anus/Perineum:  Lesions absent and Masses absent defer exam  Normal Perineum     Physical Exam  Abdominal:      Comments: Positive Carnett's sign noted on abdominal exam.        Data and DIAGNOSTIC STUDIES REVIEWED   XR transfer of outside films    Result Date: 1/10/2024  Outside images for comparison or treatment purposes, not " interpreted by  Radiologists.    XR CHEST 1V FRONTAL PORT    Result Date: 1/9/2024  * * *Final Report* * * DATE OF EXAM: Jan 9 2024  3:33PM   LUX   [id number]  -  XR CHEST 1V FRONTAL PORT  / ACCESSION #  [id number] PROCEDURE REASON: Shortness of breath      * * * * Physician Interpretation * * * *  EXAMINATION:  CHEST RADIOGRAPH (PORTABLE SINGLE VIEW AP) Exam Date/Time:  1/9/2024 3:33 PM CLINICAL HISTORY: Shortness of breath MQ:  XCPR_5 Comparison:  05/14/2015 RESULT: Lines, tubes, and devices:  None. Lungs and pleura:  No consolidation.  No lung mass.  No pleural effusion.  No pneumothorax. Cardiomediastinal silhouette:  Normal cardiomediastinal silhouette. Other:  Convex right spinal curvature.    IMPRESSION: No acute radiographic abnormality. : KAREEN   Transcribe Date/Time: Jan 9 2024  3:46P Dictated by : JESÚS ZHAO MD This examination was interpreted and the report reviewed and electronically signed by: JESÚS ZHAO MD on Jan 9 2024  3:47PM  EST    XR FOOT GENERAL 3V AP/LAT/OBL LEFT    Result Date: 1/7/2024  * * *Final Report* * * DATE OF EXAM: Jan 7 2024  8:15AM   LUX   [id number]  -  XR FOOT 3V AP/LAT/OBL LT  / ACCESSION #  [id number] PROCEDURE REASON: Other      * * * * Physician Interpretation * * * *  EXAM(s):  XR FOOT 3V AP/LAT/OBL LT EXAM DATE/TIME:  1/7/2024 8:15 AM HISTORY:  53 years old Clinical information:  Other  pt states left heel pain since thursday TECHNIQUE: Images:  XR FOOT 3V AP/LAT/OBL LT Comparison:  None. RESULT: Findings: Small anterior calcaneal heel spur.  Mild narrowing of the interphalangeal joints Bone density appears well-preserved. No fractures or dislocations are seen.    IMPRESSION: No acute pathology is identified Findings as discussed in results portion of report : KAREEN   Transcribe Date/Time: Jan 7 2024  8:23A Dictated by : CECIL SAUCEDA DO This examination was interpreted and the report reviewed and electronically signed by: CECIL SAUCEDA DO on Jan 7 2024  " 8:24AM  EST     No results found for: \"URINECULTURE\", \"UAMICCOMM\"   Lab Results   Component Value Date    GLUCOSE 139 (H) 01/18/2023    CALCIUM 9.7 01/18/2023     01/18/2023    K 4.5 01/18/2023    CO2 24 01/18/2023     (H) 01/18/2023    BUN 17 01/18/2023    CREATININE 0.85 01/18/2023     Lab Results   Component Value Date    WBC 9.8 01/18/2023    HGB 12.5 01/18/2023    HCT 39.8 01/18/2023    MCV 89 01/18/2023     01/18/2023          "

## 2024-01-21 NOTE — PROGRESS NOTES
Neetu Rios MD   Adult Reconstruction and Joint Replacement Surgery  Phone: [phone]     Fax: [phone]     INITIAL CONSULTATION    Name: Nupur Ramires  : 1970  Date of Visit:  2024    CC: Left knee pain    Clinical History:  Nupur Ramires is a 53 y.o. female who presents with 3 years of LEFT knee pain.     Patient has tried the following Corticosteroid injections . Date of last steroid injection: . Reports that she gets injections every 3 months for the past 2 years and they have been working well. Patient does have pain at night that makes it difficult to sleep. Patient is able to walk 2-3 blocks. Patient is currently using walker as assistive device. Primarily complains of posterior pain. Patient has difficulty with climbing stairs, walking , prolonged sitting , and walking on unlevel surfaces . The pain is significantly impacting her ability to perform activities of daily living. Patient reports no longer able to do activities such as swimming, walking long distances. 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 Denies any feelings of instability but reports that she feels clicking in her knee. She reports that she used to weigh over 400 pounds but has able to lose a significant amount of weight through physical activity and ozempic. Reports that she has had 2 recent hospitalizations for hypertensive emergency.     Focused History  PMH: Reviewed and PE/DVT: None  PSH: Reviewed , Hip/Knee replacement: None, Hip/Knee surgery: L ACL reconstruction, R meniscus repair, Anesthesia complications: None, Spine surgery: None, Surgical infection: None, and Weight loss surgery: None  Meds: Reviewed, Current Anticoagulants: None, Weight loss medication: Ozempic, and Current Opioids: None  Allergies: Reviewed  and The patient reports no contraindications or allergies to cephalosporins, aspirin, NSAIDs or opioids, except as noted above.  FH: No family  history of any bleeding or clotting disorders.  SHx: Reviewed, Occupation: Robotics teacher, Current smoker: No, quit 3 months prior, EtOH intake weekly: None, and Preferred physical activities: Swimming  Christianity: no    PROMs   None    Past Medical History:   Diagnosis Date    Diverticulosis of intestine, part unspecified, without perforation or abscess without bleeding 08/15/2014    Diverticulosis    Personal history of other diseases of the digestive system 2014    History of diverticulitis of colon    Personal history of other diseases of the musculoskeletal system and connective tissue     Personal history of scoliosis     Documented in chart and reviewed.     Past Surgical History:   Procedure Laterality Date    ANTERIOR CRUCIATE LIGAMENT REPAIR  2014    Primary Repair Of Knee Ligament Cruciate Anterior     SECTION, CLASSIC  2014     Section    OTHER SURGICAL HISTORY  2014    Knee Arthroscopy With Medial Meniscectomy    OTHER SURGICAL HISTORY  2019    Hernia repair       Allergies: She is allergic to bee pollen, bee venom protein (honey bee), codeine, dulaglutide, gabapentin, ketorolac, lisinopril, morphine, psyllium husk, shellfish containing products, and tramadol.     Medications:  Current Outpatient Medications   Medication Instructions    acetaminophen (Tylenol) 500 mg tablet 2 tablets, oral, Every 8 hours PRN    benzalkonium chloride 0.13 % towelette USE AS DIRECTED    chlorhexidine (Peridex) 0.12 % solution RINSE WITH 1/2 OUNCE BY MOUTH FOR 30 SECONDS THEN SPIT OUT. use twice a day    desonide (DesOwen) 0.05 % ointment APPLY SPARINGLY TO AFFECTED AREA(S) ON THE FACE DAILY FOR ITCHING AVOID USE IN THE EYES    EPINEPHrine 0.3 mg/0.3 mL injection syringe 0.3 mL, intramuscular    ergocalciferol, vitamin D2, 50 mcg (2,000 unit) tablet 2 tablets, oral, Daily    hydroCHLOROthiazide (HYDRODiuril) 25 mg tablet 1 tablet, oral, Daily    hydroCHLOROthiazide  (MICROZIDE) 12.5 mg, oral, Daily    ibuprofen 800 mg tablet TAKE ONE TABLET EVERY 8 HOURS AS NEEDED FOR PAIN    OneTouch Ultra Test strip TEST 4 TIMES DAILY.    OneTouch Ultra2 Meter misc use as directed.    semaglutide (OZEMPIC) 0.25 mg, subcutaneous, Weekly    triamcinolone (Kenalog) 0.1 % cream APPLY 1 APPLICATION TO AFFECTED AREA TWICE DAILY.    Ventolin HFA 90 mcg/actuation inhaler 2 puffs, inhalation, Every 4 hours PRN    Vitamin D3 50 mcg (2,000 unit) tablet TAKE 2 TABLET DAILY       Family History   Problem Relation Name Age of Onset    Diabetes Mother      Hypertension Mother      Scoliosis Mother      Crohn's disease Father      Hypertension Father      Heart disease Father      Breast cancer Sister      Graves' disease Sibling       Documented in chart and reviewed.     Social History     Tobacco Use    Smoking status: Former    Smokeless tobacco: Never   Substance Use Topics    Alcohol use: Defer         Review of Systems: Review of systems completed with medical assistant intake. Please refer to this note.     Falls: The patient denies any recent falls or fall-related injuries.    Physical Exam:  BMI: Body mass index is 39.51 kg/m²., which is abnormal. Encouraged to lose weight and to follow up with PCP.    Constitutional: The patient is well-appearing and well groomed.     Neurological/Psychiatric: The patient is alert and oriented to person, place and time. The patient has a normal mood and affect.    Skin Examination: The skin over the right lower extremity, left lower extremity, right upper extremity, and left upper extremity is intact without any evidence of infection or rash.    Cardiovascular Examination: There are no varicosities and the skin is normal temperature, capillary refill normal, arterial pulses normal, no edema.    Lymphatic Examination: There is no lymphatic swelling or palpable lymph nodes present around the involved joint.    Neurological Examination: Bilateral lower extremities  are grossly neurologically intact. Sensation normal, motor function normal.    Gait: The patient ambulates with an antalgic gait.     Right Hip Examination:  The skin is intact over the hip.    There is no tenderness over the greater trochanter.    Range of motion is full extension to 100 degrees of flexion.    The hip is stable without subluxation or dislocation.    The hip internally rotates to 15 degrees and externally rotates to 45 degrees.    There is no pain with hip motion.    Left Hip Examination:  The skin is intact over the hip.    There is no tenderness over the greater trochanter.    Range of motion is full extension to 100 degrees of flexion.    The hip is stable without subluxation or dislocation.    The hip internally rotates to 15 degrees and externally rotates to 45 degrees.    There is no pain with hip motion.    Left Knee Examination:  Examination of the left knee reveals the skin to be intact. Lateral longitudinal incision and anteromedial incision from prior ACL reconstruction.    There is a small effusion in the knee.    The alignment of the knee is Varus.    This deformity is not correctable.    There is tenderness to palpation over the joint line.    There is significant quadriceps atrophy.    Range of Motion: 15 flexion to 110 degrees of flexion.    The knee is stable to varus-valgus stress and anterior-posterior stress.     There is severe grinding with range of motion.    There is severe patellofemoral crepitus.    Right Knee Examination:  Examination of the right knee reveals the skin to be intact.     There is no obvious swelling.    There is a no effusion in the knee.     The alignment of the knee is normal.    There is no tenderness to palpation over the joint line.    There is no significant quadriceps atrophy.    Range of motion is 10 flexion to 110 degrees of flexion.    The knee is stable to varus-valgus stress and anterior-posterior stress.     There is moderate grinding with  "range of motion.    There is moderate patellofemoral crepitus.    Prior Labs:   Lab Results   Component Value Date    WBC 9.8 01/18/2023    HGB 12.5 01/18/2023    HCT 39.8 01/18/2023    MCV 89 01/18/2023     01/18/2023      No results found for: \"INR\", \"PROTIME\"      Lab Results   Component Value Date    GLUCOSE 139 (H) 01/18/2023    CALCIUM 9.7 01/18/2023     01/18/2023    K 4.5 01/18/2023    CO2 24 01/18/2023     (H) 01/18/2023    BUN 17 01/18/2023    CREATININE 0.85 01/18/2023      Lab Results   Component Value Date    TROPONINI <0.02 12/16/2019      Lab Results   Component Value Date    HGBA1C 5.7 11/27/2023         No results found for: \"CRP\"   Lab Results   Component Value Date    SEDRATE 22 02/11/2021        Radiographs:  Radiographs were personally reviewed today. There is evidence of severe BILATERAL knee osteoarthritis with MEDIAL bone on bone apposition. There are retained staple implants in left distal femur.     Impression:  53 y.o. female presents with severe BILATERAL knee osteoarthritis with bone on bone apposition. Patient has tried and failed multiple conservative measures and now has limitation in ADL's.     Diagnosis:  Primary osteoarthritis of left knee    Recommendations / Plan:    I have discussed the options in detail with the patient. We have discussed anti-inflammatory medication, activity modification, physical therapy, corticosteroid injections, viscosupplementation injections, partial knee replacement surgery and total knee replacement surgery.    The risks and benefits of all these treatment options have been discussed in detail. The patient has tried at least 3 months of the above conservative treatments and continues to have disabling pain, impaired activities of daily living and worsened quality of life.  Reviewed the surgical optimization steps to optimize their chances for a successful joint replacement surgery.  Currently their BMI is nearly 41.  They would need " to lose significant weight before being scheduled for surgery. Discussed that obesity is a risk factor for continued progression of osteoarthritis. Each pound of weight loss offloads their hip and knee joints by 3-6 pounds.  The most effective of these options is weight loss mainly through restricting caloric intake.  A referral to a nutritionist was offered but deferred as the patient will continue working on calorie restriction on her own. Encouraged them to maintain range of motion and strength around the knee joints.  They will continue to implement these strategies in addressing their pain.  Additionally patient was seen in the emergency department twice in the last month or so for hypertensive emergency.  Her blood pressure continues to be high with systolic pressure around 200.  I advised her to call her primary care office today for evaluation today and if unable to get in to seek evaluation in the emergency department again.  Discussed that her blood pressure is at a dangerously high level and this needs to be under better control for overall maintenance of her health in addition to optimization for potential knee replacement surgery.  The patient verbalizes understanding of the gravity of the situation and states she will reach out with her primary care physician.  I also placed a cardiology referral upon the patient's request for higher level evaluation of blood pressure management.  The patient plans to continue Mediterranean type diet.  Additionally she swims about 70 laps 3 times a week and will continue this activity.     Recommend the patient continue optimizing nonsurgical treatment interventions as outlined above for management of their knee arthritis.  I would be happy to see them again at any point to discuss surgery if they are more optimized.  She plans to see me in a couple months to reevaluate her progress.  The patient verbalizes understanding with the recommendations and treatment plan as  outlined above and is in agreement.  Questions were addressed.    _____________  Neetu Rios MD   [facility]     Approximately 45 minutes were spent on the following tasks:              Preparing for the patient              Reviewing medical records              Taking a patient history              Performing a physical exam              Reviewing treatment options with the patient              Explaining the risks, potential benefits, and alternative to surgery  Explaining the expected rehabilitation after each treatment option  Explaining the potential long term expectations  Evaluating the diagnostic imaging     This office note was transcribed with dictation software.  Please excuse any typographical errors, program misunderstandings leading to inadvertent insertions or deletions of inappropriate wording, pronoun errors and other unintentional transcription errors not noticed on proof-reading.

## 2024-01-22 ENCOUNTER — HOSPITAL ENCOUNTER (OUTPATIENT)
Dept: RADIOLOGY | Facility: CLINIC | Age: 54
Discharge: HOME | End: 2024-01-22
Payer: MEDICAID

## 2024-01-22 ENCOUNTER — OFFICE VISIT (OUTPATIENT)
Dept: ORTHOPEDIC SURGERY | Facility: CLINIC | Age: 54
End: 2024-01-22
Payer: MEDICAID

## 2024-01-22 VITALS
WEIGHT: 216 LBS | SYSTOLIC BLOOD PRESSURE: 194 MMHG | DIASTOLIC BLOOD PRESSURE: 99 MMHG | BODY MASS INDEX: 39.75 KG/M2 | HEIGHT: 62 IN

## 2024-01-22 DIAGNOSIS — M17.12 UNILATERAL PRIMARY OSTEOARTHRITIS, LEFT KNEE: ICD-10-CM

## 2024-01-22 DIAGNOSIS — M17.12 PRIMARY OSTEOARTHRITIS OF LEFT KNEE: Primary | ICD-10-CM

## 2024-01-22 PROCEDURE — 3080F DIAST BP >= 90 MM HG: CPT | Performed by: STUDENT IN AN ORGANIZED HEALTH CARE EDUCATION/TRAINING PROGRAM

## 2024-01-22 PROCEDURE — 73562 X-RAY EXAM OF KNEE 3: CPT | Mod: LT

## 2024-01-22 PROCEDURE — 73560 X-RAY EXAM OF KNEE 1 OR 2: CPT | Mod: LEFT SIDE | Performed by: RADIOLOGY

## 2024-01-22 PROCEDURE — 73560 X-RAY EXAM OF KNEE 1 OR 2: CPT | Mod: RT

## 2024-01-22 PROCEDURE — 3077F SYST BP >= 140 MM HG: CPT | Performed by: STUDENT IN AN ORGANIZED HEALTH CARE EDUCATION/TRAINING PROGRAM

## 2024-01-22 PROCEDURE — 99214 OFFICE O/P EST MOD 30 MIN: CPT | Performed by: STUDENT IN AN ORGANIZED HEALTH CARE EDUCATION/TRAINING PROGRAM

## 2024-01-22 PROCEDURE — 73562 X-RAY EXAM OF KNEE 3: CPT | Mod: LEFT SIDE | Performed by: RADIOLOGY

## 2024-01-22 PROCEDURE — 77073 BONE LENGTH STUDIES: CPT

## 2024-01-22 PROCEDURE — 77073 BONE LENGTH STUDIES: CPT | Mod: LEFT SIDE | Performed by: RADIOLOGY

## 2024-01-22 ASSESSMENT — PAIN SCALES - GENERAL: PAINLEVEL_OUTOF10: 5 - MODERATE PAIN

## 2024-01-22 ASSESSMENT — PAIN DESCRIPTION - DESCRIPTORS: DESCRIPTORS: THROBBING

## 2024-01-22 ASSESSMENT — PAIN - FUNCTIONAL ASSESSMENT: PAIN_FUNCTIONAL_ASSESSMENT: 0-10

## 2024-01-26 ENCOUNTER — OFFICE VISIT (OUTPATIENT)
Dept: CARDIOLOGY | Facility: CLINIC | Age: 54
End: 2024-01-26
Payer: MEDICAID

## 2024-01-26 VITALS
DIASTOLIC BLOOD PRESSURE: 84 MMHG | BODY MASS INDEX: 40.24 KG/M2 | SYSTOLIC BLOOD PRESSURE: 186 MMHG | OXYGEN SATURATION: 99 % | HEART RATE: 77 BPM | WEIGHT: 220 LBS

## 2024-01-26 DIAGNOSIS — I10 ESSENTIAL HYPERTENSION: Primary | ICD-10-CM

## 2024-01-26 DIAGNOSIS — Z87.892 HX OF ANAPHYLAXIS: ICD-10-CM

## 2024-01-26 DIAGNOSIS — E66.01 MORBID OBESITY (MULTI): ICD-10-CM

## 2024-01-26 DIAGNOSIS — M17.12 PRIMARY OSTEOARTHRITIS OF LEFT KNEE: ICD-10-CM

## 2024-01-26 DIAGNOSIS — T78.3XXA ANGIOEDEMA OF TONGUE: Primary | ICD-10-CM

## 2024-01-26 DIAGNOSIS — E11.9 TYPE 2 DIABETES MELLITUS WITHOUT COMPLICATION, WITHOUT LONG-TERM CURRENT USE OF INSULIN (MULTI): ICD-10-CM

## 2024-01-26 DIAGNOSIS — I16.0 HYPERTENSIVE URGENCY: ICD-10-CM

## 2024-01-26 PROBLEM — M76.62 ACHILLES TENDINITIS OF LEFT LOWER EXTREMITY: Status: RESOLVED | Noted: 2023-09-18 | Resolved: 2024-01-26

## 2024-01-26 PROBLEM — S92.352A CLOSED DISPLACED FRACTURE OF FIFTH METATARSAL BONE OF LEFT FOOT: Status: RESOLVED | Noted: 2023-09-18 | Resolved: 2024-01-26

## 2024-01-26 PROBLEM — M21.41 FLAT FEET, BILATERAL: Status: RESOLVED | Noted: 2023-09-18 | Resolved: 2024-01-26

## 2024-01-26 PROBLEM — T78.3XXS: Status: RESOLVED | Noted: 2023-09-18 | Resolved: 2024-01-26

## 2024-01-26 PROBLEM — S83.249A ACUTE MEDIAL MENISCUS TEAR: Status: RESOLVED | Noted: 2023-09-18 | Resolved: 2024-01-26

## 2024-01-26 PROBLEM — T78.40XA ALLERGIC REACTION: Status: RESOLVED | Noted: 2023-09-18 | Resolved: 2024-01-26

## 2024-01-26 PROBLEM — M21.42 FLAT FEET, BILATERAL: Status: RESOLVED | Noted: 2023-09-18 | Resolved: 2024-01-26

## 2024-01-26 PROBLEM — R39.9 UTI SYMPTOMS: Status: RESOLVED | Noted: 2023-09-18 | Resolved: 2024-01-26

## 2024-01-26 PROCEDURE — 3077F SYST BP >= 140 MM HG: CPT | Performed by: STUDENT IN AN ORGANIZED HEALTH CARE EDUCATION/TRAINING PROGRAM

## 2024-01-26 PROCEDURE — 3079F DIAST BP 80-89 MM HG: CPT | Performed by: STUDENT IN AN ORGANIZED HEALTH CARE EDUCATION/TRAINING PROGRAM

## 2024-01-26 PROCEDURE — 99204 OFFICE O/P NEW MOD 45 MIN: CPT | Performed by: STUDENT IN AN ORGANIZED HEALTH CARE EDUCATION/TRAINING PROGRAM

## 2024-01-26 RX ORDER — EPINEPHRINE 0.3 MG/.3ML
0.3 INJECTION SUBCUTANEOUS ONCE AS NEEDED
Qty: 1 EACH | Refills: 3 | Status: SHIPPED | OUTPATIENT
Start: 2024-01-26 | End: 2024-01-26 | Stop reason: SDUPTHER

## 2024-01-26 RX ORDER — AMLODIPINE BESYLATE 10 MG/1
10 TABLET ORAL DAILY
COMMUNITY
End: 2024-02-07 | Stop reason: ALTCHOICE

## 2024-01-26 RX ORDER — SPIRONOLACTONE 50 MG/1
50 TABLET, FILM COATED ORAL DAILY
Qty: 90 TABLET | Refills: 1 | Status: SHIPPED | OUTPATIENT
Start: 2024-01-26 | End: 2024-02-02

## 2024-01-26 NOTE — PROGRESS NOTES
Cardiology New Patient History and Physical    Reason for referral: ED follow-up for hypertensive urgency    HPI: Nupur Ramires is a 53 y.o.  female who presents today for hypertensive urgency. Past medical history of hypertension, Type II DM, morbid obesity, hx angioedema on lisinopril, hx anaphylaxis (bees).     Patient with a history of uncontrolled blood pressure.  Patient was recently seen in emergency room at [facility] (2024.  For dizziness and nosebleeds in the setting of hypertension.  Systolic blood pressure on presentation was 200 mmHg.  Patient denied any chest pain, dyspnea, palpitations, syncope, headaches, or pedal edema.  Patient had acute left foot pain at the time in the setting of heel spurs.  Patient remained asymptomatic from a neurologic standpoint.  Blood pressure improved after clonidine.  Patient was discharged with outpatient follow-up.    Patient presented to cardiology clinic on 2024.  Interval improvement in hypertension after initiation of amlodipine and hydrochlorothiazide per her primary care physician.  Blood pressure still remains suboptimally controlled with systolic blood pressure in the 160s to 180s.  Patient remains asymptomatic without active cardiovascular complaints.  Patient notes intermittent epistaxis and heel pain in the setting of bone spurs.  Patient recently quit smoking in 2024.    Past Medical History:   - as above    Surgical History:   She has a past surgical history that includes  section, classic (2014); Anterior cruciate ligament repair (2014); Other surgical history (2014); and Other surgical history (2019).    Family History:   Family History   Problem Relation Name Age of Onset    Diabetes Mother      Hypertension Mother      Scoliosis Mother      Crohn's disease Father      Hypertension Father      Heart disease Father      Breast cancer Sister      Graves' disease Sibling        Allergies:  Bee pollen, Bee venom protein (honey bee), Codeine, Dulaglutide, Gabapentin, Ketorolac, Lisinopril, Morphine, Psyllium husk, Shellfish containing products, and Tramadol     Social History:   - Former Smoker (Quit 1/2024); no illicit drug use  - Employed: works in [employer]     Prior Cardiovascular Testing (personally reviewed):     ECG (1/22/2024)- sinus rhythm; possible left atrial enlargement    Review of Systems:  Review of Systems   Constitutional: Negative.   HENT:  Positive for nosebleeds.    Eyes: Negative.    Cardiovascular:  Negative for chest pain, dyspnea on exertion, near-syncope, orthopnea, palpitations, paroxysmal nocturnal dyspnea and syncope.   Respiratory: Negative.     Endocrine: Negative.    Hematologic/Lymphatic: Negative.    Skin: Negative.    Musculoskeletal:  Positive for joint pain.        Foot pain in setting of heel spurs   Gastrointestinal: Negative.    Genitourinary: Negative.    Neurological: Negative.    Psychiatric/Behavioral: Negative.     Allergic/Immunologic: Negative.        Objective     Outpatient Medications:    Current Outpatient Medications:     acetaminophen (Tylenol) 500 mg tablet, Take 2 tablets (1,000 mg) by mouth every 8 hours if needed., Disp: , Rfl:     benzalkonium chloride 0.13 % towelette, USE AS DIRECTED, Disp: , Rfl:     chlorhexidine (Peridex) 0.12 % solution, RINSE WITH 1/2 OUNCE BY MOUTH FOR 30 SECONDS THEN SPIT OUT. use twice a day, Disp: , Rfl:     desonide (DesOwen) 0.05 % ointment, APPLY SPARINGLY TO AFFECTED AREA(S) ON THE FACE DAILY FOR ITCHING AVOID USE IN THE EYES, Disp: 15 g, Rfl: 2    EPINEPHrine 0.3 mg/0.3 mL injection syringe, Inject 0.3 mL (0.3 mg) into the muscle., Disp: , Rfl:     ergocalciferol, vitamin D2, 50 mcg (2,000 unit) tablet, Take 2 tablets by mouth once daily., Disp: , Rfl:     hydroCHLOROthiazide (HYDRODiuril) 25 mg tablet, Take 1 tablet (25 mg) by mouth once daily., Disp: , Rfl:     ibuprofen 800 mg tablet, TAKE ONE  TABLET EVERY 8 HOURS AS NEEDED FOR PAIN, Disp: 90 tablet, Rfl: 0    OneTouch Ultra Test strip, TEST 4 TIMES DAILY., Disp: 200 strip, Rfl: 0    OneTouch Ultra2 Meter misc, use as directed., Disp: , Rfl:     semaglutide (Ozempic) 0.25 mg or 0.5 mg(2 mg/1.5 mL) pen injector, Inject 0.25 mg under the skin once a week., Disp: , Rfl:     triamcinolone (Kenalog) 0.1 % cream, APPLY 1 APPLICATION TO AFFECTED AREA TWICE DAILY., Disp: , Rfl:     Ventolin HFA 90 mcg/actuation inhaler, Inhale 2 puffs every 4 hours if needed for wheezing or shortness of breath., Disp: , Rfl:     Vitamin D3 50 mcg (2,000 unit) tablet, TAKE 2 TABLET DAILY, Disp: 60 tablet, Rfl: 1     Last Recorded Vitals  BP (!) 186/84 (BP Location: Right arm, Patient Position: Sitting, BP Cuff Size: Large adult)   Pulse 77   Wt 99.8 kg (220 lb)   SpO2 99%   BMI 40.24 kg/m²     Physical Exam:  Physical Exam  Constitutional:       General: She is not in acute distress.     Appearance: She is obese.   HENT:      Head: Normocephalic.      Mouth/Throat:      Mouth: Mucous membranes are moist.   Eyes:      Extraocular Movements: Extraocular movements intact.      Conjunctiva/sclera: Conjunctivae normal.   Neck:      Vascular: No carotid bruit or JVD.   Cardiovascular:      Rate and Rhythm: Normal rate and regular rhythm.      Pulses: Normal pulses.      Heart sounds: No murmur heard.  Pulmonary:      Effort: Pulmonary effort is normal. No respiratory distress.      Breath sounds: Normal breath sounds.   Abdominal:      General: Bowel sounds are normal. There is no distension.      Palpations: Abdomen is soft.   Musculoskeletal:         General: No swelling.   Skin:     General: Skin is warm and dry.   Neurological:      General: No focal deficit present.      Mental Status: She is alert.      Cranial Nerves: No cranial nerve deficit.      Motor: No weakness.   Psychiatric:         Mood and Affect: Mood normal.         Behavior: Behavior normal.         Lab  "Review:    Lab Results   Component Value Date    GLUCOSE 139 (H) 01/18/2023    CALCIUM 9.7 01/18/2023     01/18/2023    K 4.5 01/18/2023    CO2 24 01/18/2023     (H) 01/18/2023    BUN 17 01/18/2023    CREATININE 0.85 01/18/2023       Lab Results   Component Value Date    WBC 9.8 01/18/2023    HGB 12.5 01/18/2023    HCT 39.8 01/18/2023    MCV 89 01/18/2023     01/18/2023       Lab Results   Component Value Date    CHOL 99 01/18/2023    CHOL 87 08/31/2020    CHOL 97 07/24/2019     Lab Results   Component Value Date    HDL 50.2 01/18/2023    HDL 36.8 (A) 08/31/2020    HDL 29.6 (A) 07/24/2019     No results found for: \"LDLCALC\"  Lab Results   Component Value Date    TRIG 60 01/18/2023    TRIG 70 08/31/2020    TRIG 189 (H) 07/24/2019       Lab Results   Component Value Date    TSH 1.77 01/18/2023       Assessment:   53 y.o.  female who presents today for hypertensive urgency. Past medical history of hypertension, Type II DM, morbid obesity, hx angioedema on lisinopril, hx anaphylaxis (bees).     Patient presented to cardiology clinic on 1/26/2024.  Interval improvement in hypertension after initiation of amlodipine and hydrochlorothiazide per her primary care physician.  Blood pressure still remains suboptimally controlled with systolic blood pressure in the 160s to 180s.  Patient remains asymptomatic without active cardiovascular complaints.  Patient notes intermittent epistaxis and heel pain in the setting of bone spurs.  Patient recently quit smoking in January 2024.     Given suboptimally controlled hypertension with interval improvement on amlodipine and hydrochlorothiazide; add spironolactone 50 mg daily and uptitrate as tolerated.  Patient has a history of angioedema with lisinopril; will defer ARB at this time.  If additional antihypertensive therapy is needed would then consider carvedilol or switching amlodipine to nifedipine extended release.  If hypertension is still " "suboptimally controlled on 3 maximally tolerated antihypertensive therapies would then consider workup for secondary causes of hypertension.    Overall Plan:  1.  Hypertension (goal blood pressure less than 130/90 mmHg)  - Interval improvement on amlodipine hydrochlorothiazide; still suboptimally controlled  - Pain may be exacerbating patient's blood pressure in the setting of heel spurs; treat additional exacerbating factors  - Add spironolactone 50 mg daily and uptitrate as tolerated  - If additional antihypertensive therapy is needed would then add carvedilol and uptitrate as tolerated and/or switch amlodipine to nifedipine extended release  - If patient has suboptimally controlled blood pressure on 3 maximally tolerated antihypertensive therapies would then consider workup for secondary causes with renal artery ultrasound, transthoracic echocardiogram, renin/Jason ratio, plasma metanephrines.    2.  Minimize NSAID use if possible as this may exacerbate hypertension    3.  Type 2 diabetes mellitus  - Continue Ozempic; management per PCP and endocrinology    4.  Morbid obesity  - Discussed need for weight loss; dietary and lifestyle modifications    5. Discussed \"red flag\" symptoms that should prompt immediate medical attention; patient verbalized understanding    Disposition: Return to cardiology clinic in 1 month for additional anti-hypertensive management    Tye Kincaid MD        "

## 2024-01-26 NOTE — LETTER
January 26, 2024     Patient: Nupur Ramires   YOB: 1970   Date of Visit: 1/26/2024       To Whom It May Concern:    Nupur Ramires was seen in my clinic on 1/26/2024 at 8:30 am. Please excuse Nupur for her absence from work on this day to make the appointment.    If you have any questions or concerns, please don't hesitate to call.         Sincerely,         Tye Kincaid MD        CC: No Recipients

## 2024-01-26 NOTE — PATIENT INSTRUCTIONS
For your high blood pressure we will continue amlodipine 10 mg daily and hydrochlorothiazide 25 mg daily    We will start an additional blood pressure medication called spirolactone; we will start spironolactone 50 mg daily (take in the morning).     We will see you back in heart clinic in ~ 1 month or earlier.     Thank you for your visit today. If you have any questions, please call my nurse Adrianna; her contact information is below.     [facility] [place]    Adrianna, RN/Clinic Nurse for:    Dr. Codi Schreiber    [address], [address]  [place] [address]    Phone: [phone] Press Option 5 then Option 3 to speak with the Clinic Nurse (Adrianna)    _____    To Reach:    Billing Questions -    [phone]  Scheduling / Rescheduling -  Option 1  Refills / Medication Requests -  Option 3  General Office / [place] -  Option 4  Results -     Option 6  Medical Records -    Option 7  Repeat Options -    Option 9

## 2024-01-31 ENCOUNTER — HOSPITAL ENCOUNTER (OUTPATIENT)
Dept: RADIOLOGY | Facility: HOSPITAL | Age: 54
Discharge: HOME | End: 2024-01-31
Payer: MEDICAID

## 2024-01-31 DIAGNOSIS — Z12.31 ENCOUNTER FOR SCREENING MAMMOGRAM FOR MALIGNANT NEOPLASM OF BREAST: ICD-10-CM

## 2024-01-31 PROCEDURE — 77067 SCR MAMMO BI INCL CAD: CPT | Performed by: RADIOLOGY

## 2024-01-31 PROCEDURE — 77067 SCR MAMMO BI INCL CAD: CPT

## 2024-01-31 PROCEDURE — 77063 BREAST TOMOSYNTHESIS BI: CPT | Performed by: RADIOLOGY

## 2024-02-02 ENCOUNTER — TELEPHONE (OUTPATIENT)
Dept: CARDIOLOGY | Facility: CLINIC | Age: 54
End: 2024-02-02
Payer: MEDICAID

## 2024-02-02 RX ORDER — EPINEPHRINE 0.3 MG/.3ML
0.3 INJECTION SUBCUTANEOUS ONCE AS NEEDED
Qty: 1 EACH | Refills: 3 | Status: SHIPPED | OUTPATIENT
Start: 2024-02-02 | End: 2024-04-30 | Stop reason: SDUPTHER

## 2024-02-02 NOTE — TELEPHONE ENCOUNTER
Patient called stating she is having a possible reaction to new medication SPIRONOLACTONE 50MG she states swelling and issues with her lips and face please call her to let her know what she should she do.

## 2024-02-02 NOTE — TELEPHONE ENCOUNTER
Patient calling to report she is developing significant tongue swelling and becoming SOB since starting new medication Spironolactone. She is on her way to [facility] ER for treatment.   Spironolactone added to allergy list.

## 2024-02-05 ASSESSMENT — ENCOUNTER SYMPTOMS
PALPITATIONS: 0
PSYCHIATRIC NEGATIVE: 1
SYNCOPE: 0
RESPIRATORY NEGATIVE: 1
EYES NEGATIVE: 1
PND: 0
NEAR-SYNCOPE: 0
GASTROINTESTINAL NEGATIVE: 1
NEUROLOGICAL NEGATIVE: 1
ENDOCRINE NEGATIVE: 1
DYSPNEA ON EXERTION: 0
ALLERGIC/IMMUNOLOGIC NEGATIVE: 1
ORTHOPNEA: 0
HEMATOLOGIC/LYMPHATIC NEGATIVE: 1
CONSTITUTIONAL NEGATIVE: 1

## 2024-02-05 NOTE — TELEPHONE ENCOUNTER
Patient was treated and released from ER. Was told to stop hydrochlorothiazide as well and follow up with cardiology. Apt for 2/28 rescheduled to 2/7.

## 2024-02-07 ENCOUNTER — OFFICE VISIT (OUTPATIENT)
Dept: CARDIOLOGY | Facility: CLINIC | Age: 54
End: 2024-02-07
Payer: MEDICAID

## 2024-02-07 VITALS
BODY MASS INDEX: 40.24 KG/M2 | HEART RATE: 78 BPM | SYSTOLIC BLOOD PRESSURE: 136 MMHG | DIASTOLIC BLOOD PRESSURE: 74 MMHG | RESPIRATION RATE: 16 BRPM | WEIGHT: 220 LBS

## 2024-02-07 DIAGNOSIS — M17.12 PRIMARY OSTEOARTHRITIS OF LEFT KNEE: ICD-10-CM

## 2024-02-07 DIAGNOSIS — I10 ESSENTIAL HYPERTENSION: Primary | ICD-10-CM

## 2024-02-07 DIAGNOSIS — E66.01 MORBID OBESITY (MULTI): ICD-10-CM

## 2024-02-07 DIAGNOSIS — E11.9 TYPE 2 DIABETES MELLITUS WITHOUT COMPLICATION, WITHOUT LONG-TERM CURRENT USE OF INSULIN (MULTI): ICD-10-CM

## 2024-02-07 PROBLEM — S83.419A SPRAIN OF MEDIAL COLLATERAL LIGAMENT OF KNEE: Status: ACTIVE | Noted: 2024-02-07

## 2024-02-07 PROBLEM — G47.33 OBSTRUCTIVE SLEEP APNEA SYNDROME: Status: ACTIVE | Noted: 2024-02-07

## 2024-02-07 PROBLEM — Z91.410 PERSONAL HISTORY OF ADULT PHYSICAL AND SEXUAL ABUSE: Status: ACTIVE | Noted: 2018-12-03

## 2024-02-07 PROBLEM — M54.6 ACUTE THORACIC BACK PAIN: Status: ACTIVE | Noted: 2023-09-18

## 2024-02-07 PROBLEM — R06.83 SNORING: Status: ACTIVE | Noted: 2024-02-07

## 2024-02-07 PROBLEM — M25.619 STIFFNESS OF SHOULDER JOINT: Status: ACTIVE | Noted: 2024-02-07

## 2024-02-07 PROBLEM — R21 RASH AND OTHER NONSPECIFIC SKIN ERUPTION: Status: ACTIVE | Noted: 2024-02-07

## 2024-02-07 PROBLEM — R61 GENERALIZED HYPERHIDROSIS: Status: ACTIVE | Noted: 2024-02-07

## 2024-02-07 PROBLEM — F39 UNSPECIFIED MOOD (AFFECTIVE) DISORDER (CMS-HCC): Status: ACTIVE | Noted: 2024-02-07

## 2024-02-07 PROBLEM — Z91.199 NONCOMPLIANCE: Status: ACTIVE | Noted: 2024-02-07

## 2024-02-07 PROBLEM — N81.3 THIRD DEGREE UTERINE PROLAPSE: Status: ACTIVE | Noted: 2022-10-21

## 2024-02-07 PROBLEM — M25.50 ARTHRALGIA OF MULTIPLE JOINTS: Status: ACTIVE | Noted: 2024-02-07

## 2024-02-07 PROBLEM — T78.3XXA ANGIOEDEMA: Status: ACTIVE | Noted: 2023-09-18

## 2024-02-07 PROBLEM — H02.9 LESION OF EYELID: Status: ACTIVE | Noted: 2024-02-07

## 2024-02-07 PROBLEM — N90.7 LABIAL CYST: Status: ACTIVE | Noted: 2024-02-07

## 2024-02-07 PROBLEM — J40 BRONCHITIS, NOT SPECIFIED AS ACUTE OR CHRONIC: Status: ACTIVE | Noted: 2024-02-07

## 2024-02-07 PROBLEM — Z62.819 HISTORY OF ABUSE IN CHILDHOOD: Status: ACTIVE | Noted: 2018-12-03

## 2024-02-07 PROBLEM — M79.7 FIBROMYALGIA: Status: ACTIVE | Noted: 2024-02-07

## 2024-02-07 PROCEDURE — 3078F DIAST BP <80 MM HG: CPT | Performed by: STUDENT IN AN ORGANIZED HEALTH CARE EDUCATION/TRAINING PROGRAM

## 2024-02-07 PROCEDURE — 3075F SYST BP GE 130 - 139MM HG: CPT | Performed by: STUDENT IN AN ORGANIZED HEALTH CARE EDUCATION/TRAINING PROGRAM

## 2024-02-07 PROCEDURE — 99213 OFFICE O/P EST LOW 20 MIN: CPT | Performed by: STUDENT IN AN ORGANIZED HEALTH CARE EDUCATION/TRAINING PROGRAM

## 2024-02-07 ASSESSMENT — ENCOUNTER SYMPTOMS
CONSTITUTIONAL NEGATIVE: 1
SYNCOPE: 0
PSYCHIATRIC NEGATIVE: 1
EYES NEGATIVE: 1
RESPIRATORY NEGATIVE: 1
PND: 0
DYSPNEA ON EXERTION: 0
NEAR-SYNCOPE: 0
ENDOCRINE NEGATIVE: 1
HEMATOLOGIC/LYMPHATIC NEGATIVE: 1
ORTHOPNEA: 0
GASTROINTESTINAL NEGATIVE: 1
NEUROLOGICAL NEGATIVE: 1
PALPITATIONS: 0
ALLERGIC/IMMUNOLOGIC NEGATIVE: 1

## 2024-02-07 NOTE — PROGRESS NOTES
Cardiology Outpatient Follow-up visit    Reason for referral: ED follow-up for tongue swelling after starting spironolactone     HPI: Nupur Ramires is a 53 y.o.  female who presents today for hypertensive urgency. Past medical history of hypertension, Type II DM, morbid obesity, hx angioedema on lisinopril, hx anaphylaxis (bees).     Patient with a history of uncontrolled blood pressure.  Patient was recently seen in emergency room at [facility] (1/9/2024.  For dizziness and nosebleeds in the setting of hypertension.  Systolic blood pressure on presentation was 200 mmHg.  Patient denied any chest pain, dyspnea, palpitations, syncope, headaches, or pedal edema.  Patient had acute left foot pain at the time in the setting of heel spurs.  Patient remained asymptomatic from a neurologic standpoint.  Blood pressure improved after clonidine.  Patient was discharged with outpatient follow-up.    Patient presented to cardiology clinic on 1/26/2024.  Interval improvement in hypertension after initiation of amlodipine and hydrochlorothiazide per her primary care physician.  Blood pressure still remains suboptimally controlled with systolic blood pressure in the 160s to 180s.  Patient remains asymptomatic without active cardiovascular complaints.  Patient notes intermittent epistaxis and heel pain in the setting of bone spurs.  Patient recently quit smoking in January 2024.    At our visit on 1/26/2024, given suboptimally controlled hypertension with interval improvement on amlodipine and hydrochlorothiazide; added spironolactone 50 mg daily and uptitrate as tolerated.  Patient has a history of angioedema with lisinopril; will defer ARB at this time.  If additional antihypertensive therapy is needed would then consider carvedilol or switching amlodipine to nifedipine extended release.  If hypertension is still suboptimally controlled on 3 maximally tolerated antihypertensive therapies would then  consider workup for secondary causes of hypertension.    Patient had tongue swelling after starting spironolactone; patient was seen in [facility] ED; spironolactone and hydrochlorothiazide stopped.  Amlodipine was switched to nifedipine ER 30 mg daily    Nupur returned to cardiology clinic today (2024) for an ED follow-up visit.  Tongue swelling has resolved.  BP currently well-controlled (136/74 mmHg) on nifedipine ER 30 mg daily.  No active cardiac complaints.      Past Medical History:   - as above    Surgical History:   She has a past surgical history that includes  section, classic (2014); Anterior cruciate ligament repair (2014); Other surgical history (2014); and Other surgical history (2019).    Family History:   Family History   Problem Relation Name Age of Onset    Diabetes Mother      Hypertension Mother      Scoliosis Mother      Crohn's disease Father      Hypertension Father      Heart disease Father      Breast cancer Sister  61    Graves' disease Sibling       Allergies:  Spironolactone, Codeine, Ketorolac, Lisinopril, Morphine, Bee venom protein (honey bee), Dulaglutide, Gabapentin, Psyllium husk, Shellfish containing products, and Tramadol     Social History:   - Former Smoker (Quit 2024); no illicit drug use  - Employed: works in [employer]     Prior Cardiovascular Testing (personally reviewed):     ECG (2024)- sinus rhythm; possible left atrial enlargement    Review of Systems:  Review of Systems   Constitutional: Negative.   Eyes: Negative.    Cardiovascular:  Negative for chest pain, dyspnea on exertion, near-syncope, orthopnea, palpitations, paroxysmal nocturnal dyspnea and syncope.   Respiratory: Negative.     Endocrine: Negative.    Hematologic/Lymphatic: Negative.    Skin: Negative.    Musculoskeletal:  Positive for joint pain.        Foot pain in setting of heel spurs; improving   Gastrointestinal: Negative.    Genitourinary: Negative.     Neurological: Negative.    Psychiatric/Behavioral: Negative.     Allergic/Immunologic: Negative.        Objective     Outpatient Medications:    Current Outpatient Medications:     benzalkonium chloride 0.13 % towelette, USE AS DIRECTED, Disp: , Rfl:     desonide (DesOwen) 0.05 % ointment, APPLY SPARINGLY TO AFFECTED AREA(S) ON THE FACE DAILY FOR ITCHING AVOID USE IN THE EYES, Disp: 15 g, Rfl: 2    EPINEPHrine 0.3 mg/0.3 mL injection syringe, Inject 0.3 mL (0.3 mg) into the muscle 1 time if needed for anaphylaxis for up to 4 doses., Disp: 1 each, Rfl: 3    ergocalciferol, vitamin D2, 50 mcg (2,000 unit) tablet, Take 2 tablets by mouth once daily., Disp: , Rfl:     ibuprofen 800 mg tablet, TAKE ONE TABLET EVERY 8 HOURS AS NEEDED FOR PAIN, Disp: 90 tablet, Rfl: 0    semaglutide (Ozempic) 0.25 mg or 0.5 mg(2 mg/1.5 mL) pen injector, Inject 0.25 mg under the skin once a week., Disp: , Rfl:     triamcinolone (Kenalog) 0.1 % cream, APPLY 1 APPLICATION TO AFFECTED AREA TWICE DAILY., Disp: , Rfl:     Ventolin HFA 90 mcg/actuation inhaler, Inhale 2 puffs every 4 hours if needed for wheezing or shortness of breath., Disp: , Rfl:     Vitamin D3 50 mcg (2,000 unit) tablet, TAKE 2 TABLET DAILY, Disp: 60 tablet, Rfl: 1     Last Recorded Vitals  /74 (BP Location: Right arm, Patient Position: Sitting)   Pulse 78   Resp 16   Wt 99.8 kg (220 lb)   LMP  (LMP Unknown)   BMI 40.24 kg/m²     Physical Exam:  Physical Exam  Constitutional:       General: She is not in acute distress.     Appearance: She is obese.   HENT:      Head: Normocephalic.      Mouth/Throat:      Mouth: Mucous membranes are moist.   Eyes:      Extraocular Movements: Extraocular movements intact.      Conjunctiva/sclera: Conjunctivae normal.   Neck:      Vascular: No carotid bruit or JVD.   Cardiovascular:      Rate and Rhythm: Normal rate and regular rhythm.      Pulses: Normal pulses.      Heart sounds: No murmur heard.  Pulmonary:      Effort:  "Pulmonary effort is normal. No respiratory distress.   Abdominal:      General: There is no distension.   Musculoskeletal:         General: No swelling.   Skin:     General: Skin is warm and dry.   Neurological:      General: No focal deficit present.      Mental Status: She is alert.      Cranial Nerves: No cranial nerve deficit.      Motor: No weakness.   Psychiatric:         Mood and Affect: Mood normal.         Behavior: Behavior normal.         Lab Review:    Lab Results   Component Value Date    GLUCOSE 139 (H) 01/18/2023    CALCIUM 9.7 01/18/2023     01/18/2023    K 4.5 01/18/2023    CO2 24 01/18/2023     (H) 01/18/2023    BUN 17 01/18/2023    CREATININE 0.85 01/18/2023       Lab Results   Component Value Date    WBC 9.8 01/18/2023    HGB 12.5 01/18/2023    HCT 39.8 01/18/2023    MCV 89 01/18/2023     01/18/2023       Lab Results   Component Value Date    CHOL 99 01/18/2023    CHOL 87 08/31/2020    CHOL 97 07/24/2019     Lab Results   Component Value Date    HDL 50.2 01/18/2023    HDL 36.8 (A) 08/31/2020    HDL 29.6 (A) 07/24/2019     No results found for: \"LDLCALC\"  Lab Results   Component Value Date    TRIG 60 01/18/2023    TRIG 70 08/31/2020    TRIG 189 (H) 07/24/2019       Lab Results   Component Value Date    TSH 1.77 01/18/2023       Assessment:   53 y.o.  female who presents today for hypertensive urgency. Past medical history of hypertension, Type II DM, morbid obesity, hx angioedema on lisinopril, hx anaphylaxis (bees).     At our visit on 1/26/2024, given suboptimally controlled hypertension with interval improvement on amlodipine and hydrochlorothiazide; added spironolactone 50 mg daily and uptitrate as tolerated.  Patient has a history of angioedema with lisinopril; will defer ARB at this time.  If additional antihypertensive therapy is needed would then consider carvedilol or switching amlodipine to nifedipine extended release.  If hypertension is still " "suboptimally controlled on 3 maximally tolerated antihypertensive therapies would then consider workup for secondary causes of hypertension.    Patient had tongue swelling after starting spironolactone; patient was seen in [facility] ED; spironolactone and hydrochlorothiazide stopped.  Amlodipine was switched to nifedipine ER 30 mg daily. Nupur returned to cardiology clinic today (2/7/2024) for an ED follow-up visit.  Tongue swelling has resolved.  BP relatively well-controlled (136/74 mmHg).  No active cardiac complaints.  Will continue amlodipine 10 mg daily; if additional anti-hypertensive therapy needed would then add carvedilol 12.5 mg BID.     Overall Plan:  1.  Hypertension (goal blood pressure less than 130/90 mmHg; improved controlled)  - Hydrochlorothiazide and spironolactone recently stopped due to tongue swelling (2/2024)  - Interval improvement in hypertension control on nifedipine ER 30 mg   - Prior pain may be exacerbating patient's blood pressure in the setting of heel spurs  - If additional antihypertensive therapy is needed would then increase nifedipine ER; if still sub-optimal on max dose of nifedipine would then add carvedilol 12.5 mg BID    2.  Minimize NSAID use if possible as this may exacerbate hypertension    3.  Type 2 diabetes mellitus  - Continue Ozempic; management per PCP and endocrinology    4.  Morbid obesity  - Discussed need for weight loss; dietary and lifestyle modifications    5. Discussed \"red flag\" symptoms that should prompt immediate medical attention; patient verbalized understanding    Disposition: Return to cardiology clinic in 3-6 month for additional anti-hypertensive management    Tye Kincaid MD        "

## 2024-02-07 NOTE — PATIENT INSTRUCTIONS
Your blood pressure control is improved on nifedipine; we will continue nifedipine and adjust if needed.     You tongue swelling has improved off hydrochlorothiazide and after stopping spironolactone; we will avoid these medications in the future.     If you have any questions, please call my nurse Adrianna; her contact information is below.     We will see you back in ~ 3 months or earlier if needed.     Thank you for your visit today. Please contact our office (via [employer] or phone) with any additional questions.     [facility] [place]    Adrianna, RN/Clinic Nurse for:    Dr. Codi Schreiber    [address], [address]  [place] [address]    Phone: [phone] Press Option 5 then Option 3 to speak with the Clinic Nurse (Adrianna)    _____    To Reach:    Billing Questions -    [phone]  Scheduling / Rescheduling -  Option 1  Refills / Medication Requests -  Option 3  General Office / [place] -  Option 4  Results -     Option 6  Medical Records -    Option 7  Repeat Options -    Option 9      
(4) no impairment

## 2024-02-12 ENCOUNTER — ALLIED HEALTH (OUTPATIENT)
Dept: INTEGRATIVE MEDICINE | Facility: CLINIC | Age: 54
End: 2024-02-12
Payer: MEDICAID

## 2024-02-12 DIAGNOSIS — M54.17 LUMBOSACRAL RADICULITIS: ICD-10-CM

## 2024-02-12 DIAGNOSIS — M99.04 SACROILIAC JOINT SOMATIC DYSFUNCTION: ICD-10-CM

## 2024-02-12 DIAGNOSIS — M99.02 SOMATIC DYSFUNCTION OF THORACIC REGION: ICD-10-CM

## 2024-02-12 DIAGNOSIS — M47.816 LUMBAR SPONDYLOSIS: ICD-10-CM

## 2024-02-12 DIAGNOSIS — M99.03 SEGMENTAL AND SOMATIC DYSFUNCTION OF LUMBAR REGION: Primary | ICD-10-CM

## 2024-02-12 PROCEDURE — 98941 CHIROPRACT MANJ 3-4 REGIONS: CPT | Performed by: CHIROPRACTOR

## 2024-02-12 ASSESSMENT — ENCOUNTER SYMPTOMS
TROUBLE SWALLOWING: 0
CONSTIPATION: 0
CONFUSION: 0
DIARRHEA: 0
CHEST TIGHTNESS: 0
FEVER: 0
FREQUENCY: 0
ABDOMINAL PAIN: 0
JOINT SWELLING: 0
FATIGUE: 0

## 2024-02-12 NOTE — PROGRESS NOTES
Subjective   Patient ID: Nupur Ramires is a 53 y.o. female who presents today for low back pain.    Multiple trips to the emergency department /[address] [place] [address]    HPI -over the past month the patient has had several episodes of hypertension which were severe.  This led to multiple trips to the emergency department.  While some of this was explained, she will like some of it was related to medication reaction.  Her blood pressure has been more manage for the past week and she is feeling better.  She has been very diligent with the advisement of her family physician and has noticed even some weight loss as a result.  Physically, she does continue to have frequent lower back pain which is described as stiff and sore but does feel better overall.    Review of Systems   Constitutional:  Negative for fatigue and fever.   HENT:  Negative for trouble swallowing.    Eyes:  Negative for visual disturbance.   Respiratory:  Negative for chest tightness.    Cardiovascular:  Negative for chest pain and leg swelling.   Gastrointestinal:  Negative for abdominal pain, constipation and diarrhea.   Genitourinary:  Negative for frequency.   Musculoskeletal:  Negative for joint swelling.   Neurological:  Negative for syncope.   Psychiatric/Behavioral:  Negative for confusion.    All other systems reviewed and are negative.      Relevant Imaging:    Objective     The patient is alert and oriented x 3. FHC.  Cranial nerves II-XII are grossly intact. Mild difficulty with transitional movements is observed.  Rounded shoulders.  Increased thoracic kyphosis. Elevated right iliac crest.  Leg length is symmetric.  Antalgic gait.    Moderate hypertonicity and tenderness is present in the rhomboids,  thoracic paraspinals, lumbar paraspinals, quadratus lumborum, upper gluteals, piriformis, left greater than right.  Reduced severity.  (There appears to be a lipoma present in the left paraspinal musculature.)    Segmental joint dysfunction was assessed  with motion palpation and is identified in the following areas:  Cervical:   Thoracic: T4/5, T5/6  Lumbopelvic: L4/5, L5/S1 Left SI > Right SI  Reduced severity.    Assessment/Plan   Physical presentation is largely unchanged when compared to last visit.  When considering her hypertension concerns I have decided to place an order for integrative medicine consult.    (07/18/2022 CR: The patient's initial presentation is consistent with lumbar spondylosis, lumbosacral radiculitis, and mechanical joint dysfunction. Her condition is complicated by her idiopathic scoliosis. Based on the chronicity of her complaints I do feel that a trial of acupuncture alongside chiropractic care will help optimize the patient's outcome.)     Today's treatment:  Low force manipulation applied to the: left SI joint.  Flexion-distraction applied to the lumbar spine  Low force manipulation applied to the involved thoracic segments    Neuromuscular re-education: IDN applied to lumbar paraspinals and B gluteals (7 in, 7 out).  7 minutes.    Treatment Plan:   The patient tolerated today's treatment with little or no additional discomfort and was instructed to contact the office for questions or concerns. Return in about one month.    Please note: Voice to text software was used when completing this note. While the note was proofread, portions may include grammatical errors. Please contact me with any questions/concerns as it relates to these types of errors.

## 2024-02-28 ENCOUNTER — APPOINTMENT (OUTPATIENT)
Dept: CARDIOLOGY | Facility: CLINIC | Age: 54
End: 2024-02-28
Payer: MEDICAID

## 2024-03-06 ENCOUNTER — OFFICE VISIT (OUTPATIENT)
Dept: ORTHOPEDIC SURGERY | Facility: HOSPITAL | Age: 54
End: 2024-03-06
Payer: MEDICAID

## 2024-03-06 DIAGNOSIS — M17.0 OSTEOARTHRITIS OF BOTH KNEES, UNSPECIFIED OSTEOARTHRITIS TYPE: Primary | ICD-10-CM

## 2024-03-06 PROCEDURE — 99213 OFFICE O/P EST LOW 20 MIN: CPT | Performed by: PHYSICIAN ASSISTANT

## 2024-03-06 PROCEDURE — 2500000004 HC RX 250 GENERAL PHARMACY W/ HCPCS (ALT 636 FOR OP/ED): Performed by: PHYSICIAN ASSISTANT

## 2024-03-06 PROCEDURE — 2500000005 HC RX 250 GENERAL PHARMACY W/O HCPCS: Performed by: PHYSICIAN ASSISTANT

## 2024-03-06 NOTE — PROGRESS NOTES
[facility] knee injections      Patient is here today regarding bilateral knee pain.  She has advanced osteoarthritis of both of her knees.  She had some updated x-rays this past January.  She has recently seen a provider to discuss total knee arthroplasty and plans on pursuing this in the near future.  In the meantime she would like to repeat corticosteroid injections that do provide her some relief.  I do think that is reasonable and we proceeded as below.        Patient ID: Nupur Ramires is a 53 y.o. female.    L Inj/Asp: bilateral knee on 3/12/2024 11:47 AM  Indications: pain  Details: 22 G needle, anterior approach  Medications (Right): 40 mg methylPREDNISolone acetate 40 mg/mL; 4 mL lidocaine 10 mg/mL (1 %)  Medications (Left): 40 mg methylPREDNISolone acetate 40 mg/mL; 4 mL lidocaine 10 mg/mL (1 %)    Under sterile conditions the left and right knee were injected with 4cc of lidocaine 40mg DepoMedrol.  The patient tolerated the procedure well.  There were no apparent complications.  Sterile bandage was applied.  Procedure, treatment alternatives, risks and benefits explained, specific risks discussed. Consent was given by the patient. Immediately prior to procedure a time out was called to verify the correct patient, procedure, equipment, support staff and site/side marked as required. Patient was prepped and draped in the usual sterile fashion.               Olga Flores PA-C

## 2024-03-10 NOTE — PROGRESS NOTES
" Neetu Rios MD   Adult Reconstruction and Joint Replacement Surgery  Phone: [phone]     Fax: [phone]     Follow-up Knee    Name: Nupur Ramires  : 1970  Date of Visit:  3/25/2024    CC: Follow-up {LEFT RIGHT BILATERAL:85301} knee     History of Present Illness:  Nupur Ramires is a 53 y.o. female who presents with {Numbers; 1-10:55807} {weeks, months, years:81384} of {LEFT RIGHT BILATERAL:52240} knee pain.     The patient presents for follow-up evaluation of *** knee. They were last seen for this problem on ***. ***    Patient has tried the following {treatments tried:18302}. Date of last steroid injection: ***. Patient {DOES/DOES NOT:84997} have pain at night. Patient is able to walk {Blocks:08984}. Patient is currently using {assistive device:89445} as assistive device. Primarily complains of {pain location:61809} pain. Patient has difficulty with {activity limitations:09539}. The pain is significantly impacting her ability to perform activities of daily living. Patient reports no longer able to do activities such as ***.     Focused History  PMH: {FOCUSEDHX:57913::\"Reviewed\",\"PE/DVT: ***\"}  PSH: {FOCUSEDSHX:40313::\"Reviewed \",\"Hip/Knee replacement: ***\",\"Hip/Knee surgery: ***\",\"Anesthesia complications: ***\",\"Spine surgery: ***\",\"Surgical infection: ***\",\"Weight loss surgery: ***\"}  Meds: {MEDSHX:02074::\"Reviewed\",\"Current Anticoagulants: ***\",\"Weight loss medication: ***\",\"Current Opioids: ***\"}  Allergies: {MEDSHX:22126::\"Reviewed \",\"The patient reports no contraindications or allergies to cephalosporins, aspirin, NSAIDs or opioids, except as noted above.\"}  FH: ***No family history of any bleeding or clotting disorders.  SHx: {SOCX:71984::\"Reviewed\",\"Occupation: ***\",\"Current smoker: ***\",\"EtOH intake weekly: ***\",\"Social support: ***\",\"Preferred physical activities: ***\"}  Gnosticism: no***    Medications:  Current Outpatient Medications   Medication Instructions    " benzalkonium chloride 0.13 % towelette USE AS DIRECTED    desonide (DesOwen) 0.05 % ointment APPLY SPARINGLY TO AFFECTED AREA(S) ON THE FACE DAILY FOR ITCHING AVOID USE IN THE EYES    EPINEPHrine (EPIPEN) 0.3 mg, intramuscular, Once as needed    ergocalciferol, vitamin D2, 50 mcg (2,000 unit) tablet 2 tablets, oral, Daily    ibuprofen 800 mg tablet TAKE ONE TABLET EVERY 8 HOURS AS NEEDED FOR PAIN    semaglutide (OZEMPIC) 0.25 mg, subcutaneous, Weekly    triamcinolone (Kenalog) 0.1 % cream APPLY 1 APPLICATION TO AFFECTED AREA TWICE DAILY.    Ventolin HFA 90 mcg/actuation inhaler 2 puffs, inhalation, Every 4 hours PRN    Vitamin D3 50 mcg (2,000 unit) tablet TAKE 2 TABLET DAILY       Allergies:  Allergies as of 03/25/2024 - Reviewed 02/07/2024   Allergen Reaction Noted    Spironolactone Angioedema 02/02/2024    Codeine Other 09/18/2023    Ketorolac Hives, Itching, and Swelling 09/18/2023    Lisinopril Angioedema 05/19/2022    Morphine Itching and Swelling 09/18/2023    Bee venom protein (honey bee) Unknown 09/18/2023    Dulaglutide Unknown 09/18/2023    Gabapentin Unknown 09/18/2023    Psyllium husk Hives 09/18/2023    Shellfish containing products Swelling 09/18/2023    Tramadol Itching, Rash, and Other 09/18/2023       Past Medical History:    Past Medical History:   Diagnosis Date    Achilles tendinitis of left lower extremity 09/18/2023    Acute medial meniscus tear 09/18/2023    Allergic reaction 09/18/2023    Angio-edema, sequela 09/18/2023    Closed displaced fracture of fifth metatarsal bone of left foot 09/18/2023    Diverticulosis of intestine, part unspecified, without perforation or abscess without bleeding 08/15/2014    Diverticulosis    Flat feet, bilateral 09/18/2023    Personal history of other diseases of the digestive system 07/23/2014    History of diverticulitis of colon    Personal history of other diseases of the musculoskeletal system and connective tissue     Personal history of scoliosis        Past Surgical History:   Past Surgical History:   Procedure Laterality Date    ANTERIOR CRUCIATE LIGAMENT REPAIR  2014    Primary Repair Of Knee Ligament Cruciate Anterior     SECTION, CLASSIC  2014     Section    OTHER SURGICAL HISTORY  2014    Knee Arthroscopy With Medial Meniscectomy    OTHER SURGICAL HISTORY  2019    Hernia repair       Social History:   Social History     Socioeconomic History    Marital status:      Spouse name: Not on file    Number of children: Not on file    Years of education: Not on file    Highest education level: Not on file   Occupational History    Not on file   Tobacco Use    Smoking status: Former    Smokeless tobacco: Never    Tobacco comments:     Quit 2023   Substance and Sexual Activity    Alcohol use: Defer    Drug use: Defer    Sexual activity: Defer   Other Topics Concern    Not on file   Social History Narrative    Not on file     Social Determinants of Health     Financial Resource Strain: Not on file   Food Insecurity: Not on file   Transportation Needs: Not on file   Physical Activity: Not on file   Stress: Not on file   Social Connections: Not on file   Intimate Partner Violence: Not on file   Housing Stability: Not on file       Family History:   Family History   Problem Relation Name Age of Onset    Diabetes Mother      Hypertension Mother      Scoliosis Mother      Crohn's disease Father      Hypertension Father      Heart disease Father      Breast cancer Sister  61    Graves' disease Sibling         Review of Systems: Review of systems completed with medical assistant intake. Please refer to this note.     Physical Exam:  BMI: There is no height or weight on file to calculate BMI., which is ***abnormal. ***Encouraged to lose weight and to follow up with PCP.    General: The patient is well-appearing, alert and oriented to time, place and person and has an appropriate affect.     Neurological Examination:  "Sensation normal, motor function normal, coordination / cerebellum normal, reflexes normal.***    Cardiovascular Exam: Capillary refill normal, arterial pulses normal***, no varicose veins, no edema.    Skin Exam: Skin throughout the upper and lower extremities is normal without any evidence of infection or rash.    Gait: patient ambulates with an antalgic*** gait.***    Right Hip Examination:  The skin is intact over the hip.    There is no tenderness over the greater trochanter.    Range of motion is full extension to 100 degrees of flexion.    The hip is stable without subluxation or dislocation.    The hip internally rotates to 15 degrees and externally rotates to 45 degrees.    There is no pain with hip motion.    Left Hip Examination:  The skin is intact over the hip.    There is no tenderness over the greater trochanter.    Range of motion is full extension to 100 degrees of flexion.    The hip is stable without subluxation or dislocation.    The hip internally rotates to 15 degrees and externally rotates to 45 degrees.    There is no pain with hip motion.    Left Knee Examination:  Examination of the left knee reveals the skin to be intact.    There is {Blank single:23661::\"no\",\"a small\",\"a large\",\"a moderate\"} effusion in the knee.    The alignment of the knee is {Blank single:29793::\"Valgus\",\"neutral\",\"Varus\"}.    This deformity {Blank single:23440::\"is not\",\"is\"} correctable.    There is tenderness to palpation over the joint line.    There is significant quadriceps atrophy.    Range of Motion: {rom3:19295::\"20\",\"15\",\"10\",\"5\",\"full extension\"} to {rom4:92960::\"less than 90\",\"90\",\"100\",\"110\",\"120\"} degrees of flexion.    The knee is stable to varus-valgus stress and anterior-posterior stress.     There is {no/mild/moderate/severe:19664} grinding with range of motion.    There is {no/mild/moderate/severe:19664} patellofemoral crepitus.    Right Knee Examination:  Examination of the left knee reveals the " "skin to be intact.    There is {Blank single:33771::\"no\",\"a small\",\"a large\",\"a moderate\"} effusion in the knee.    The alignment of the knee is {Blank single:22374::\"Valgus\",\"neutral\",\"Varus\"}.    This deformity {Blank single:05906::\"is not\",\"is\"} correctable.    There is tenderness to palpation over the joint line.    There is significant quadriceps atrophy.    Range of Motion: {rom3:48382::\"20\",\"15\",\"10\",\"5\",\"full extension\"} to {rom4:46796::\"less than 90\",\"90\",\"100\",\"110\",\"120\"} degrees of flexion.    The knee is stable to varus-valgus stress and anterior-posterior stress.     There is {no/mild/moderate/severe:19664} grinding with range of motion.    There is {no/mild/moderate/severe:19664} patellofemoral crepitus.    Imaging:  X-rays were personally reviewed today and show ***implants in good position with no evidence of complication.    ***Radiographs were personally reviewed today. There is evidence of {mild, moderate, severe:55738} {LEFT RIGHT BILATERAL:42294} knee osteoarthritis {with or without:36280} {medial, lateral, patellofemoral, tri:73822} bone on bone apposition.    Impression and Plan:  53 y.o. female here for follow-up evaluation of {LEFT RIGHT BILATERAL:23526} knee.    No diagnosis found.    ***I have discussed the options in detail with the patient. We have discussed anti-inflammatory medication, activity modification, physical therapy, corticosteroid injections, viscosupplementation injections, ***partial knee replacement surgery and total knee replacement surgery.    ***The risks and benefits of all these treatment options have been discussed in detail. The patient has tried at least 3 months of the above conservative treatments and continues to have disabling pain, impaired activities of daily living and worsened quality of life.  Reviewed the surgical optimization steps to optimize their chances for a successful joint replacement surgery.  Currently their BMI is ***.  They would need to lose " significant weight before being scheduled for surgery. Discussed that obesity is a risk factor for continued progression of osteoarthritis. Each pound of weight loss offloads their hip and knee joints by 3-6 pounds.  The most effective of these options is weight loss mainly through restricting caloric intake.  A referral to a nutritionist was offered***. A referral to bariatric surgery was offered***. Encouraged them to maintain range of motion and strength around the knee joints.  They will continue to implement these strategies in addressing their pain.       ***Recommend the patient continue optimizing nonsurgical treatment interventions as outlined above for management of their knee arthritis.  I would be happy to see them again at any point to discuss surgery if they are more optimized.  The patient verbalizes understanding with the recommendations and treatment plan as outlined above and is in agreement.  Questions were addressed.    ***    RTC: {RTC:26669}    X-rays at next visit: {X-rays:83151}    ***Large Joint Injection 59852: Knee  Consent given by: Patient  Timeout: Immediately prior to procedure the correct patient, procedure, and site was verified. Sterile gloves and semi-sterile technique were used.   Indications: Knee pain and joint swelling.   Location: {LEFT RIGHT BILATERAL:79117} knee  Needle size: 22 G  Approach: Lateral    Medications administered: Please refer to medical assistant note for lot number and expiration date.   Subcutaneous   4 ml of 1% lidocaine     Deep   4 ml of 1% lidocaine   4 mL 0.5% marcaine   2 mL of 40 mg kenalog     Aspirate amount: ***  Aspirate Appearance: ***   ***Analysis: Fluid sent for laboratory analysis, including cell count, differential, culture, crystal, AFB    Patient tolerance: Dressing applied. Patient tolerated the procedure well with no immediate complications.    _____________________  Neetu Rios MD   [facility]  [place]

## 2024-03-11 ENCOUNTER — APPOINTMENT (OUTPATIENT)
Dept: INTEGRATIVE MEDICINE | Facility: CLINIC | Age: 54
End: 2024-03-11
Payer: MEDICAID

## 2024-03-12 ENCOUNTER — ALLIED HEALTH (OUTPATIENT)
Dept: INTEGRATIVE MEDICINE | Facility: CLINIC | Age: 54
End: 2024-03-12
Payer: MEDICAID

## 2024-03-12 DIAGNOSIS — M99.02 SOMATIC DYSFUNCTION OF THORACIC REGION: ICD-10-CM

## 2024-03-12 DIAGNOSIS — M99.04 SACROILIAC JOINT SOMATIC DYSFUNCTION: ICD-10-CM

## 2024-03-12 DIAGNOSIS — M47.816 LUMBAR SPONDYLOSIS: ICD-10-CM

## 2024-03-12 DIAGNOSIS — M99.03 SEGMENTAL AND SOMATIC DYSFUNCTION OF LUMBAR REGION: Primary | ICD-10-CM

## 2024-03-12 DIAGNOSIS — M54.17 LUMBOSACRAL RADICULITIS: ICD-10-CM

## 2024-03-12 PROCEDURE — 98941 CHIROPRACT MANJ 3-4 REGIONS: CPT | Performed by: CHIROPRACTOR

## 2024-03-12 PROCEDURE — 2500000005 HC RX 250 GENERAL PHARMACY W/O HCPCS: Performed by: PHYSICIAN ASSISTANT

## 2024-03-12 PROCEDURE — 20610 DRAIN/INJ JOINT/BURSA W/O US: CPT | Performed by: PHYSICIAN ASSISTANT

## 2024-03-12 PROCEDURE — 2500000004 HC RX 250 GENERAL PHARMACY W/ HCPCS (ALT 636 FOR OP/ED): Performed by: PHYSICIAN ASSISTANT

## 2024-03-12 RX ORDER — METHYLPREDNISOLONE ACETATE 40 MG/ML
40 INJECTION, SUSPENSION INTRA-ARTICULAR; INTRALESIONAL; INTRAMUSCULAR; SOFT TISSUE
Status: COMPLETED | OUTPATIENT
Start: 2024-03-12 | End: 2024-03-12

## 2024-03-12 RX ORDER — LIDOCAINE HYDROCHLORIDE 10 MG/ML
4 INJECTION INFILTRATION; PERINEURAL
Status: COMPLETED | OUTPATIENT
Start: 2024-03-12 | End: 2024-03-12

## 2024-03-12 RX ADMIN — LIDOCAINE HYDROCHLORIDE 4 ML: 10 INJECTION, SOLUTION INFILTRATION; PERINEURAL at 11:47

## 2024-03-12 RX ADMIN — METHYLPREDNISOLONE ACETATE 40 MG: 40 INJECTION, SUSPENSION INTRA-ARTICULAR; INTRALESIONAL; INTRAMUSCULAR; INTRASYNOVIAL; SOFT TISSUE at 11:47

## 2024-03-12 ASSESSMENT — ENCOUNTER SYMPTOMS
FREQUENCY: 0
ABDOMINAL PAIN: 0
TROUBLE SWALLOWING: 0
DIARRHEA: 0
FATIGUE: 0
CONSTIPATION: 0
CONFUSION: 0
JOINT SWELLING: 0
FEVER: 0
CHEST TIGHTNESS: 0

## 2024-03-12 NOTE — PROGRESS NOTES
Subjective   Patient ID: Nupur Ramires is a 53 y.o. female who presents today for low back pain.    3/15 VPCY    HPI -Doing better overall.  She does continue to endorse low back and hip pain, but symptoms are much more manageable with chiropractic care.  She does report having a feeling of being uneven and the hips.  She does note that recently her blood pressure has been much more under control and expresses optimism with that.    Review of Systems   Constitutional:  Negative for fatigue and fever.   HENT:  Negative for trouble swallowing.    Eyes:  Negative for visual disturbance.   Respiratory:  Negative for chest tightness.    Cardiovascular:  Negative for chest pain and leg swelling.   Gastrointestinal:  Negative for abdominal pain, constipation and diarrhea.   Genitourinary:  Negative for frequency.   Musculoskeletal:  Negative for joint swelling.   Neurological:  Negative for syncope.   Psychiatric/Behavioral:  Negative for confusion.    All other systems reviewed and are negative.      Relevant Imaging:    Objective     The patient is alert and oriented x 3. FHC.  Cranial nerves II-XII are grossly intact. Mild difficulty with transitional movements is observed.  Rounded shoulders.  Increased thoracic kyphosis. Elevated right iliac crest.  Leg length is symmetric.  Antalgic gait.    Moderate hypertonicity and tenderness is present in the rhomboids,  thoracic paraspinals, lumbar paraspinals, quadratus lumborum, upper gluteals, piriformis, left greater than right.  Reduced severity.  (There appears to be a lipoma present in the left paraspinal musculature.)    Segmental joint dysfunction was assessed with motion palpation and is identified in the following areas:  Cervical:   Thoracic: T4/5, T5/6  Lumbopelvic: L4/5, L5/S1 Left SI > Right SI  Reduced severity.    Assessment/Plan   Presentation has improved since last visit.    (07/18/2022 CR: The patient's initial presentation is consistent with lumbar  spondylosis, lumbosacral radiculitis, and mechanical joint dysfunction. Her condition is complicated by her idiopathic scoliosis. Based on the chronicity of her complaints I do feel that a trial of acupuncture alongside chiropractic care will help optimize the patient's outcome.)     Today's treatment:  Low force manipulation applied to the: left SI joint.  Manual traction applied to the lumbar spine  Low force manipulation applied to the involved thoracic segments    Neuromuscular re-education: IDN applied to lumbar paraspinals and L gluteals (7 in, 7 out).  7 minutes.    Treatment Plan:   The patient tolerated today's treatment with little or no additional discomfort and was instructed to contact the office for questions or concerns. Return in about one month.    Please note: Voice to text software was used when completing this note. While the note was proofread, portions may include grammatical errors. Please contact me with any questions/concerns as it relates to these types of errors.

## 2024-03-25 ENCOUNTER — APPOINTMENT (OUTPATIENT)
Dept: ORTHOPEDIC SURGERY | Facility: CLINIC | Age: 54
End: 2024-03-25
Payer: MEDICAID

## 2024-04-01 ENCOUNTER — APPOINTMENT (OUTPATIENT)
Dept: ORTHOPEDIC SURGERY | Facility: CLINIC | Age: 54
End: 2024-04-01
Payer: MEDICAID

## 2024-04-08 ENCOUNTER — APPOINTMENT (OUTPATIENT)
Dept: INTEGRATIVE MEDICINE | Facility: CLINIC | Age: 54
End: 2024-04-08
Payer: MEDICAID

## 2024-04-09 ENCOUNTER — ALLIED HEALTH (OUTPATIENT)
Dept: INTEGRATIVE MEDICINE | Facility: CLINIC | Age: 54
End: 2024-04-09
Payer: MEDICAID

## 2024-04-09 DIAGNOSIS — M99.02 SOMATIC DYSFUNCTION OF THORACIC REGION: ICD-10-CM

## 2024-04-09 DIAGNOSIS — M54.17 LUMBOSACRAL RADICULITIS: ICD-10-CM

## 2024-04-09 DIAGNOSIS — M99.04 SACROILIAC JOINT SOMATIC DYSFUNCTION: ICD-10-CM

## 2024-04-09 DIAGNOSIS — M47.816 LUMBAR SPONDYLOSIS: ICD-10-CM

## 2024-04-09 DIAGNOSIS — M99.03 SEGMENTAL AND SOMATIC DYSFUNCTION OF LUMBAR REGION: Primary | ICD-10-CM

## 2024-04-09 PROCEDURE — 98941 CHIROPRACT MANJ 3-4 REGIONS: CPT | Performed by: CHIROPRACTOR

## 2024-04-09 ASSESSMENT — ENCOUNTER SYMPTOMS
FREQUENCY: 0
DIARRHEA: 0
TROUBLE SWALLOWING: 0
JOINT SWELLING: 0
FATIGUE: 0
CONFUSION: 0
ABDOMINAL PAIN: 0
CHEST TIGHTNESS: 0
FEVER: 0
CONSTIPATION: 0

## 2024-04-09 NOTE — PROGRESS NOTES
Subjective   Patient ID: Nupur Ramires is a 53 y.o. female who presents today for low back pain.    4/15 VP    HPI -the patient reports that for about the past 3 weeks she has experienced right-sided lumbosacral and gluteal pain.  She does not report any specific event that triggered this flare.  Pain is especially noticed in the right gluteal area and she has noticed pain into the right groin region as well.  Walking and transitional movements are especially difficult.      Review of Systems   Constitutional:  Negative for fatigue and fever.   HENT:  Negative for trouble swallowing.    Eyes:  Negative for visual disturbance.   Respiratory:  Negative for chest tightness.    Cardiovascular:  Negative for chest pain and leg swelling.   Gastrointestinal:  Negative for abdominal pain, constipation and diarrhea.   Genitourinary:  Negative for frequency.   Musculoskeletal:  Negative for joint swelling.   Neurological:  Negative for syncope.   Psychiatric/Behavioral:  Negative for confusion.    All other systems reviewed and are negative.      Relevant Imaging:    Objective     The patient is alert and oriented x 3. FHC.  Cranial nerves II-XII are grossly intact. Difficulty with transitional movements is observed.  Rounded shoulders.  Increased thoracic kyphosis. Elevated left iliac crest.  Leg length is symmetric.  Antalgic gait.    Moderate-severe hypertonicity and tenderness is present in the rhomboids,  thoracic paraspinals, lumbar paraspinals, quadratus lumborum, upper gluteals, piriformis, left greater than right.  Reduced severity.  (There appears to be a lipoma present in the left paraspinal musculature.)    Segmental joint dysfunction was assessed with motion palpation and is identified in the following areas:  Cervical:   Thoracic: T4/5, T5/6  Lumbopelvic: L4/5, L5/S1 Left SI, Right SI.  PI on right.    Assessment/Plan   Exacerbation on condition, involving right SI joint.    (07/18/2022 CR: The patient's  initial presentation is consistent with lumbar spondylosis, lumbosacral radiculitis, and mechanical joint dysfunction. Her condition is complicated by her idiopathic scoliosis. Based on the chronicity of her complaints I do feel that a trial of acupuncture alongside chiropractic care will help optimize the patient's outcome.)     Today's treatment:  Low force manipulation applied to the: right SI joint.  Pelvic blocking applied to B SI joints.  Manual traction applied to the lumbar spine  Low force manipulation applied to the involved thoracic segments    Neuromuscular re-education: IDN applied to lumbar paraspinals and R>L gluteals (9 in, 9 out).  7 minutes.    She is very emotional/reactive during treatment, which is normal for her.    Treatment Plan:   The patient tolerated today's treatment with little or no additional discomfort and was instructed to contact the office for questions or concerns. Return in about one month, sooner if needed.    Please note: Voice to text software was used when completing this note. While the note was proofread, portions may include grammatical errors. Please contact me with any questions/concerns as it relates to these types of errors.

## 2024-04-29 DIAGNOSIS — T78.3XXA ANGIOEDEMA OF TONGUE: ICD-10-CM

## 2024-04-29 DIAGNOSIS — Z87.892 HX OF ANAPHYLAXIS: ICD-10-CM

## 2024-04-30 RX ORDER — EPINEPHRINE 0.3 MG/.3ML
0.3 INJECTION SUBCUTANEOUS ONCE AS NEEDED
Qty: 1 EACH | Refills: 3 | Status: SHIPPED | OUTPATIENT
Start: 2024-04-30

## 2024-05-14 ENCOUNTER — ALLIED HEALTH (OUTPATIENT)
Dept: INTEGRATIVE MEDICINE | Facility: CLINIC | Age: 54
End: 2024-05-14
Payer: MEDICAID

## 2024-05-14 DIAGNOSIS — M54.17 LUMBOSACRAL RADICULITIS: ICD-10-CM

## 2024-05-14 DIAGNOSIS — M99.02 SOMATIC DYSFUNCTION OF THORACIC REGION: ICD-10-CM

## 2024-05-14 DIAGNOSIS — M99.03 SEGMENTAL AND SOMATIC DYSFUNCTION OF LUMBAR REGION: Primary | ICD-10-CM

## 2024-05-14 DIAGNOSIS — M99.04 SACROILIAC JOINT SOMATIC DYSFUNCTION: ICD-10-CM

## 2024-05-14 DIAGNOSIS — M47.816 LUMBAR SPONDYLOSIS: ICD-10-CM

## 2024-05-14 PROCEDURE — 98941 CHIROPRACT MANJ 3-4 REGIONS: CPT | Performed by: CHIROPRACTOR

## 2024-05-14 ASSESSMENT — ENCOUNTER SYMPTOMS
CONSTIPATION: 0
FREQUENCY: 0
ABDOMINAL PAIN: 0
DIARRHEA: 0
FEVER: 0
CONFUSION: 0
CHEST TIGHTNESS: 0
JOINT SWELLING: 0
TROUBLE SWALLOWING: 0
FATIGUE: 0

## 2024-05-14 NOTE — PROGRESS NOTES
Subjective   Patient ID: Nupur Ramires is a 54 y.o. female who presents today for low back pain.    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 Currently, she is experiencing right-sided lumbosacral and gluteal pain.  The pain is described as sharp and will have additional pain with transitional movements.       Review of Systems   Constitutional:  Negative for fatigue and fever.   HENT:  Negative for trouble swallowing.    Eyes:  Negative for visual disturbance.   Respiratory:  Negative for chest tightness.    Cardiovascular:  Negative for chest pain and leg swelling.   Gastrointestinal:  Negative for abdominal pain, constipation and diarrhea.   Genitourinary:  Negative for frequency.   Musculoskeletal:  Negative for joint swelling.   Neurological:  Negative for syncope.   Psychiatric/Behavioral:  Negative for confusion.    All other systems reviewed and are negative.      Relevant Imaging:    Objective     The patient is alert and oriented x 3. FHC.  Cranial nerves II-XII are grossly intact. Difficulty with transitional movements is observed.  Rounded shoulders.  Increased thoracic kyphosis. Elevated left iliac crest.  Leg length is symmetric.  Antalgic gait.    Moderate hypertonicity and tenderness is present in the rhomboids, thoracic paraspinals, lumbar paraspinals, quadratus lumborum, upper gluteals, piriformis, R>L.   (There appears to be a lipoma present in the left paraspinal musculature.)    Segmental joint dysfunction was assessed with motion palpation and is identified in the following areas:  Cervical:   Thoracic: T4/5, T5/6  Lumbopelvic: L4/5, L5/S1 Left SI, Right SI.  PI on right.    Assessment/Plan   See diagnoses.    (07/18/2022 CR: The patient's initial presentation is consistent with lumbar spondylosis, lumbosacral radiculitis, and mechanical joint dysfunction. Her condition is complicated by her idiopathic scoliosis. Based on the chronicity of  her complaints I do feel that a trial of acupuncture alongside chiropractic care will help optimize the patient's outcome.)     Today's treatment:  Low force manipulation applied to the: right SI joint.  Pelvic blocking applied to B SI joints.  Manual traction applied to the lumbar spine  Low force manipulation applied to the involved thoracic segments    Neuromuscular re-education: IDN applied to lumbar paraspinals and R gluteals (7 in, 7 out).  7 minutes.    Treatment Plan:   The patient tolerated today's treatment with little or no additional discomfort and was instructed to contact the office for questions or concerns. Return in about one month, sooner if needed.    Please note: Voice to text software was used when completing this note. While the note was proofread, portions may include grammatical errors. Please contact me with any questions/concerns as it relates to these types of errors.

## 2024-05-28 ASSESSMENT — DERMATOLOGY QUALITY OF LIFE (QOL) ASSESSMENT
RATE HOW EMOTIONALLY BOTHERED YOU ARE BY YOUR SKIN PROBLEM (FOR EXAMPLE, WORRY, EMBARRASSMENT, FRUSTRATION): 2
RATE HOW BOTHERED YOU ARE BY SYMPTOMS OF YOUR SKIN PROBLEM (EG, ITCHING, STINGING BURNING, HURTING OR SKIN IRRITATION): 2
WHAT SINGLE SKIN CONDITION LISTED BELOW IS THE PATIENT ANSWERING THE QUALITY-OF-LIFE ASSESSMENT QUESTIONS ABOUT: NONE OF THE ABOVE
DATE THE QUALITY-OF-LIFE ASSESSMENT WAS COMPLETED: 05/28/2024
RATE HOW BOTHERED YOU ARE BY EFFECTS OF YOUR SKIN PROBLEMS ON YOUR ACTIVITIES (EG, GOING OUT, ACCOMPLISHING WHAT YOU WANT, WORK ACTIVITIES OR YOUR RELATIONSHIPS WITH OTHERS): 1
RATE HOW BOTHERED YOU ARE BY EFFECTS OF YOUR SKIN PROBLEMS ON YOUR ACTIVITIES (EG, GOING OUT, ACCOMPLISHING WHAT YOU WANT, WORK ACTIVITIES OR YOUR RELATIONSHIPS WITH OTHERS): 1
WHAT SINGLE SKIN CONDITION LISTED BELOW IS THE PATIENT ANSWERING THE QUALITY-OF-LIFE ASSESSMENT QUESTIONS ABOUT: NONE OF THE ABOVE
RATE HOW EMOTIONALLY BOTHERED YOU ARE BY YOUR SKIN PROBLEM (FOR EXAMPLE, WORRY, EMBARRASSMENT, FRUSTRATION): 2
DATE THE QUALITY-OF-LIFE ASSESSMENT WAS COMPLETED: 66988
RATE HOW BOTHERED YOU ARE BY SYMPTOMS OF YOUR SKIN PROBLEM (EG, ITCHING, STINGING BURNING, HURTING OR SKIN IRRITATION): 2
ARE THERE EXCLUSIONS OR EXCEPTIONS FOR THE QUALITY OF LIFE ASSESSMENT: NO

## 2024-05-28 ASSESSMENT — PATIENT GLOBAL ASSESSMENT (PGA): WHAT IS THE PGA: PATIENT GLOBAL ASSESSMENT:  1 - CLEAR

## 2024-05-30 ENCOUNTER — OFFICE VISIT (OUTPATIENT)
Dept: DERMATOLOGY | Facility: CLINIC | Age: 54
End: 2024-05-30
Payer: MEDICAID

## 2024-05-30 DIAGNOSIS — L81.9 HYPOPIGMENTATION: ICD-10-CM

## 2024-05-30 DIAGNOSIS — L81.1 MELASMA: Primary | ICD-10-CM

## 2024-05-30 PROCEDURE — 99214 OFFICE O/P EST MOD 30 MIN: CPT | Performed by: STUDENT IN AN ORGANIZED HEALTH CARE EDUCATION/TRAINING PROGRAM

## 2024-05-30 RX ORDER — TACROLIMUS 1 MG/G
OINTMENT TOPICAL
Qty: 30 G | Refills: 3 | Status: SHIPPED | OUTPATIENT
Start: 2024-05-30

## 2024-05-30 RX ORDER — TRETINOIN 0.5 MG/G
CREAM TOPICAL
Qty: 20 G | Refills: 3 | Status: SHIPPED | OUTPATIENT
Start: 2024-05-30

## 2024-05-30 NOTE — PROGRESS NOTES
Subjective     Nupur Ramires is a 54 y.o. female who presents for the following: hyperpigmentation (Face, patient would like to get prescribed tretinoin for the discoloration of her skin).     Review of Systems:  No other skin or systemic complaints other than what is documented elsewhere in the note.    The following portions of the chart were reviewed this encounter and updated as appropriate:          Skin Cancer History  No skin cancer on file.      Specialty Problems          Dermatology Problems    Other hypertrophic disorders of the skin    Rash and other nonspecific skin eruption        Objective   Well appearing patient in no apparent distress; mood and affect are within normal limits.    A focused skin examination was performed. All findings within normal limits unless otherwise noted below.    Assessment/Plan   1. Melasma  Head - Anterior (Face)  Hyperpigmentation on upper lip, malar cheeks    tretinoin (Retin-A) 0.05 % cream - Head - Anterior (Face)  Apply a small 1/2 pea sized amount to clean dry face at bedtime.  Start off 2x/week and increase as tolerated.    2. Hypopigmentation (2)  Left Upper Cutaneous Lip, Mid Lower Cutaneous Lip  Several light colored macules around areas of discoloration/hyperpigmentation    Vitiligo? Or irritation  Trial of tacrolimus ointment    Related Medications  tacrolimus (Protopic) 0.1 % ointment  Use on light areas on skin, thin layer, in evening      Melasma:    1) tretinoin cream    2) consider glycolic acid peels- cosmetic, in the future    3) sun screen, sun screen, sun screen:  Mineral with TINT: neutrogena      2.vitiligo vs post inflammatory hypopigmentation vs other  See photos of upper and lower lips  Trial of tacrolimus ointment  Will send to [facility] if not approved  Follow up in 3 month

## 2024-05-30 NOTE — PATIENT INSTRUCTIONS
Melasma:    1) tretinoin cream in the evening on lips followed by tacrolimus ointment 15 minute later (ill call it in to a compounding pharmacy if your regular pharmacy doesn't cover it)    2) consider glycolic acid peels- cosmetic, in the future    3) sun screen, sun screen, sun screen:  Mineral with TINT: neutrogena    4) see me in 4 month

## 2024-05-31 ENCOUNTER — TELEPHONE (OUTPATIENT)
Dept: DERMATOLOGY | Facility: CLINIC | Age: 54
End: 2024-05-31
Payer: MEDICAID

## 2024-05-31 NOTE — TELEPHONE ENCOUNTER
Pharmacy notified of approved prior authorization for Tacrolimus ointment. [place] approved from 5/30/2024 through 5/29/2025. The prior authorization for tretinoin cream was denied by insurance. Pharmacy said they could give it to the patient at a discounted price of $30.

## 2024-06-04 ENCOUNTER — ALLIED HEALTH (OUTPATIENT)
Dept: INTEGRATIVE MEDICINE | Facility: CLINIC | Age: 54
End: 2024-06-04
Payer: MEDICAID

## 2024-06-04 DIAGNOSIS — M99.02 SOMATIC DYSFUNCTION OF THORACIC REGION: ICD-10-CM

## 2024-06-04 DIAGNOSIS — M99.04 SACROILIAC JOINT SOMATIC DYSFUNCTION: ICD-10-CM

## 2024-06-04 DIAGNOSIS — M54.17 LUMBOSACRAL RADICULITIS: ICD-10-CM

## 2024-06-04 DIAGNOSIS — M47.816 LUMBAR SPONDYLOSIS: ICD-10-CM

## 2024-06-04 DIAGNOSIS — M99.03 SEGMENTAL AND SOMATIC DYSFUNCTION OF LUMBAR REGION: Primary | ICD-10-CM

## 2024-06-04 PROCEDURE — 98941 CHIROPRACT MANJ 3-4 REGIONS: CPT | Performed by: CHIROPRACTOR

## 2024-06-04 ASSESSMENT — ENCOUNTER SYMPTOMS
FEVER: 0
TROUBLE SWALLOWING: 0
JOINT SWELLING: 0
CONSTIPATION: 0
ABDOMINAL PAIN: 0
FREQUENCY: 0
CONFUSION: 0
DIARRHEA: 0
FATIGUE: 0
CHEST TIGHTNESS: 0

## 2024-06-04 NOTE — PROGRESS NOTES
Subjective   Patient ID: Nupur Ramires is a 54 y.o. female who presents today for low back pain.    6/15 VPCY    HPI - the left knee continues to be problematic for her.  It is affecting her walking and, in turn, leading to an increase in right-sided lumbosacral pain. It  has also been affecting her ability to perform other ADL's.  She has an upcoming appointment with orthopedics to discuss treatment options, such as arthroplasty.     Review of Systems   Constitutional:  Negative for fatigue and fever.   HENT:  Negative for trouble swallowing.    Eyes:  Negative for visual disturbance.   Respiratory:  Negative for chest tightness.    Cardiovascular:  Negative for chest pain and leg swelling.   Gastrointestinal:  Negative for abdominal pain, constipation and diarrhea.   Genitourinary:  Negative for frequency.   Musculoskeletal:  Negative for joint swelling.   Neurological:  Negative for syncope.   Psychiatric/Behavioral:  Negative for confusion.    All other systems reviewed and are negative.      Relevant Imaging:    Objective     The patient is alert and oriented x 3. FHC.  Cranial nerves II-XII are grossly intact. Difficulty with transitional movements is observed.  Rounded shoulders.  Increased thoracic kyphosis. Elevated left iliac crest.  Leg length is symmetric.  Antalgic gait due to left knee.    Moderate hypertonicity and tenderness is present in the rhomboids, thoracic paraspinals, lumbar paraspinals, quadratus lumborum, upper gluteals, piriformis, R>L.   (There appears to be a lipoma present in the left paraspinal musculature.)    Segmental joint dysfunction was assessed with motion palpation and is identified in the following areas:  Cervical:   Thoracic: T4/5, T5/6  Lumbopelvic: L4/5, L5/S1 Left SI, Right SI.  PI on right.    Assessment/Plan   See diagnoses. Her left knee is a complicating factor.    (07/18/2022 CR: The patient's initial presentation is consistent with lumbar spondylosis, lumbosacral  radiculitis, and mechanical joint dysfunction. Her condition is complicated by her idiopathic scoliosis. Based on the chronicity of her complaints I do feel that a trial of acupuncture alongside chiropractic care will help optimize the patient's outcome.)     Today's treatment:  Low force manipulation applied to the: right SI joint.  Pelvic blocking applied to left SI joint.  Manual traction applied to the lumbar spine  Low force manipulation applied to the involved thoracic segments    Neuromuscular re-education: IDN applied to lumbar paraspinals and B gluteals (8 in/out).  7 minutes.    Treatment Plan:   The patient tolerated today's treatment with little or no additional discomfort and was instructed to contact the office for questions or concerns. Return in about one month, sooner if needed.    Please note: Voice to text software was used when completing this note. While the note was proofread, portions may include grammatical errors. Please contact me with any questions/concerns as it relates to these types of errors.

## 2024-06-14 ENCOUNTER — ALLIED HEALTH (OUTPATIENT)
Dept: INTEGRATIVE MEDICINE | Facility: CLINIC | Age: 54
End: 2024-06-14
Payer: MEDICAID

## 2024-06-14 DIAGNOSIS — M54.17 LUMBOSACRAL RADICULITIS: ICD-10-CM

## 2024-06-14 DIAGNOSIS — M99.04 SACROILIAC JOINT SOMATIC DYSFUNCTION: ICD-10-CM

## 2024-06-14 DIAGNOSIS — M99.02 SOMATIC DYSFUNCTION OF THORACIC REGION: ICD-10-CM

## 2024-06-14 DIAGNOSIS — M54.9 DORSALGIA: ICD-10-CM

## 2024-06-14 DIAGNOSIS — M47.816 LUMBAR SPONDYLOSIS: ICD-10-CM

## 2024-06-14 DIAGNOSIS — M79.10 MYALGIA, UNSPECIFIED SITE: ICD-10-CM

## 2024-06-14 DIAGNOSIS — M99.03 SEGMENTAL AND SOMATIC DYSFUNCTION OF LUMBAR REGION: Primary | ICD-10-CM

## 2024-06-14 PROCEDURE — 98941 CHIROPRACT MANJ 3-4 REGIONS: CPT | Performed by: CHIROPRACTOR

## 2024-06-14 ASSESSMENT — ENCOUNTER SYMPTOMS
ABDOMINAL PAIN: 0
FATIGUE: 0
CONSTIPATION: 0
TROUBLE SWALLOWING: 0
JOINT SWELLING: 0
DIARRHEA: 0
FEVER: 0
CHEST TIGHTNESS: 0
FREQUENCY: 0
CONFUSION: 0

## 2024-06-14 NOTE — PROGRESS NOTES
"Subjective   Patient ID: Nupur Ramires is a 54 y.o. female who presents today for upper and lower back pain.    7/15 VPCY    HPI - Presenting earlier than expected due to flare-up.  She reports that this has been present for the past 3 days and is severe.  It was at the point where she could hardly walk and had to use a walker during one of the days.  She reports that has been over 2 years since she has had a flareup like this.  Symptoms are right-sided and range from the scapular area to the lumbosacral region.  She denies any feelings of numbness.  She denies any changes in diet.  When asked, she does report additional stress of late secondary to family matters.    Review of Systems   Constitutional:  Negative for fatigue and fever.   HENT:  Negative for trouble swallowing.    Eyes:  Negative for visual disturbance.   Respiratory:  Negative for chest tightness.    Cardiovascular:  Negative for chest pain and leg swelling.   Gastrointestinal:  Negative for abdominal pain, constipation and diarrhea.   Genitourinary:  Negative for frequency.   Musculoskeletal:  Negative for joint swelling.   Neurological:  Negative for syncope.   Psychiatric/Behavioral:  Negative for confusion.    All other systems reviewed and are negative.    Relevant Imaging:  MRI of lumbar spine from 11/20/2021: \"Mild to moderate rotatory levoconvex scoliosis and mild grade 1  anterolisthesis at L5-S1 associated with multilevel spondylosis most  notably minor disc bulging at L4-5 and moderate to advanced posterior   facet arthropathy at L4-5 and L5-S1 concordant with x-ray lumbar spine  series 09/22/2021.\"     Objective     The patient is alert and oriented x 3. She is moderate-severe distress. Cranial nerves II-XII are grossly intact. Difficulty with transitional movements is observed.  Rounded shoulders. Elevated right shoulder.  Increased thoracic kyphosis. Elevated left iliac crest.  Leg length is symmetric.  Antalgic/altered gait due to " pain levels.    No observable rash/other skin lesions on areas of complaint.    Moderate-severe hypertonicity, spasm and tenderness is present in the right levator scapulae, right rhomboids, thoracic paraspinals, lumbar paraspinals, quadratus lumborum, upper gluteals, piriformis, R>L.   (There appears to be a lipoma present in the left paraspinal musculature.)    Segmental joint dysfunction was assessed with motion palpation and is identified in the following areas:  Cervical:   Thoracic: T4/5, T5/6  Lumbopelvic: L4/5, L5/S1 Left SI, Right SI.  PI on right.    Assessment/Plan   The patient is in the midst of a flareup of her mechanical pain.  During further discussion I do feel that this is secondary to a stress response.  Advised her that it would be worth seeking out counseling for her to help deal with her emotional stressors.    (07/18/2022 CR: The patient's initial presentation is consistent with lumbar spondylosis, lumbosacral radiculitis, and mechanical joint dysfunction. Her condition is complicated by her idiopathic scoliosis. Based on the chronicity of her complaints I do feel that a trial of acupuncture alongside chiropractic care will help optimize the patient's outcome.)     Today's treatment:  Low force manipulation applied to the: right SI joint.  Pelvic blocking applied to right SI joint.  Flexion-distraction applied to the lumbar spine  Low force and instrument-assisted manipulation applied to the involved thoracic segments    Neuromuscular re-education: IDN applied to lumbar paraspinals, R gluteals, R distal levator scapulae (10 in/out).  7 minutes.    Treatment Plan:   The patient tolerated today's treatment with little or no additional discomfort and was instructed to contact the office for questions or concerns. Return within 2 weeks, sooner if needed.    Please note: Voice to text software was used when completing this note. While the note was proofread, portions may include grammatical errors.  Please contact me with any questions/concerns as it relates to these types of errors.

## 2024-06-21 ENCOUNTER — OFFICE VISIT (OUTPATIENT)
Dept: ORTHOPEDIC SURGERY | Facility: CLINIC | Age: 54
End: 2024-06-21
Payer: MEDICAID

## 2024-06-21 ENCOUNTER — APPOINTMENT (OUTPATIENT)
Dept: ORTHOPEDIC SURGERY | Facility: HOSPITAL | Age: 54
End: 2024-06-21
Payer: MEDICAID

## 2024-06-21 DIAGNOSIS — M17.0 OSTEOARTHRITIS OF BOTH KNEES, UNSPECIFIED OSTEOARTHRITIS TYPE: ICD-10-CM

## 2024-06-21 DIAGNOSIS — M17.12 PRIMARY OSTEOARTHRITIS OF LEFT KNEE: Primary | ICD-10-CM

## 2024-06-21 DIAGNOSIS — M17.11 PRIMARY OSTEOARTHRITIS OF RIGHT KNEE: ICD-10-CM

## 2024-06-21 PROCEDURE — 2500000005 HC RX 250 GENERAL PHARMACY W/O HCPCS: Performed by: STUDENT IN AN ORGANIZED HEALTH CARE EDUCATION/TRAINING PROGRAM

## 2024-06-21 PROCEDURE — 20610 DRAIN/INJ JOINT/BURSA W/O US: CPT | Performed by: STUDENT IN AN ORGANIZED HEALTH CARE EDUCATION/TRAINING PROGRAM

## 2024-06-21 PROCEDURE — 99213 OFFICE O/P EST LOW 20 MIN: CPT | Performed by: STUDENT IN AN ORGANIZED HEALTH CARE EDUCATION/TRAINING PROGRAM

## 2024-06-21 PROCEDURE — 2500000004 HC RX 250 GENERAL PHARMACY W/ HCPCS (ALT 636 FOR OP/ED): Performed by: STUDENT IN AN ORGANIZED HEALTH CARE EDUCATION/TRAINING PROGRAM

## 2024-06-21 RX ORDER — TRIAMCINOLONE ACETONIDE 40 MG/ML
80 INJECTION, SUSPENSION INTRA-ARTICULAR; INTRAMUSCULAR
Status: COMPLETED | OUTPATIENT
Start: 2024-06-21 | End: 2024-06-21

## 2024-06-21 RX ORDER — LIDOCAINE HYDROCHLORIDE 10 MG/ML
8 INJECTION INFILTRATION; PERINEURAL
Status: COMPLETED | OUTPATIENT
Start: 2024-06-21 | End: 2024-06-21

## 2024-06-21 RX ORDER — ROPIVACAINE HYDROCHLORIDE 5 MG/ML
4 INJECTION, SOLUTION EPIDURAL; INFILTRATION; PERINEURAL
Status: COMPLETED | OUTPATIENT
Start: 2024-06-21 | End: 2024-06-21

## 2024-06-21 ASSESSMENT — PAIN DESCRIPTION - DESCRIPTORS: DESCRIPTORS: ACHING;SORE

## 2024-06-21 ASSESSMENT — PAIN SCALES - GENERAL: PAINLEVEL_OUTOF10: 5 - MODERATE PAIN

## 2024-06-21 ASSESSMENT — PAIN - FUNCTIONAL ASSESSMENT: PAIN_FUNCTIONAL_ASSESSMENT: 0-10

## 2024-06-21 NOTE — PROGRESS NOTES
Neetu Rios MD   Adult Reconstruction and Joint Replacement Surgery  Phone: [phone]     Fax: [phone]     Follow-up Knee    Name: Nupur Ramires  : 1970  Date of Visit:  2024    CC: Follow-up BILATERAL knee, left worse than right    History of Present Illness:  Nupur Ramires is a 54 y.o. female who presents with 3 years of left worse than right knee pain.     The patient presents for follow-up evaluation of BILATERAL knee. They were last seen for this problem on 2024.  She had previously undergone injections under Olga Flores PA-C with continued excellent relief.  She previously was seen with high blood pressure and this is since been optimized with her primary care physician.  She also the meantime has been working on weight loss.     Patient has tried the following Corticosteroid injections . Date of last steroid injection: . Reports that she gets injections every 3 months for the past 2 years and they have been working well. Patient does have pain at night that makes it difficult to sleep. Patient is able to walk 2-3 blocks. Patient is currently using walker as assistive device. Primarily complains of posterior pain. Patient has difficulty with climbing stairs, walking , prolonged sitting , and walking on unlevel surfaces . The pain is significantly impacting her ability to perform activities of daily living. Patient reports no longer able to do activities such as swimming, walking long distances. 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 Denies any feelings of instability but reports that she feels clicking in her knee. She reports that she used to weigh over 400 pounds but has able to lose a significant amount of weight through physical activity and ozempic. Reports that she has had 2 recent hospitalizations for hypertensive emergency.      Focused History  PMH: Reviewed and PE/DVT: None  PSH: Reviewed , Hip/Knee replacement: None,  Hip/Knee surgery: L ACL reconstruction, R meniscus repair, Anesthesia complications: None, Spine surgery: None, Surgical infection: None, and Weight loss surgery: None  Meds: Reviewed, Current Anticoagulants: None, Weight loss medication: Ozempic, and Current Opioids: None  Allergies: Reviewed  and The patient reports no contraindications or allergies to cephalosporins, aspirin, NSAIDs or opioids, except as noted above.  FH: No family history of any bleeding or clotting disorders.  SHx: Reviewed, Occupation: Robotics teacher, Current smoker: No, quit 3 months prior, EtOH intake weekly: None, and Preferred physical activities: Swimming  Islam: no       Medications:  Current Outpatient Medications   Medication Instructions    benzalkonium chloride 0.13 % towelette USE AS DIRECTED    desonide (DesOwen) 0.05 % ointment APPLY SPARINGLY TO AFFECTED AREA(S) ON THE FACE DAILY FOR ITCHING AVOID USE IN THE EYES    EPINEPHrine (EPIPEN) 0.3 mg, intramuscular, Once as needed    ergocalciferol, vitamin D2, 50 mcg (2,000 unit) tablet 2 tablets, oral, Daily    ibuprofen 800 mg tablet TAKE ONE TABLET EVERY 8 HOURS AS NEEDED FOR PAIN    semaglutide (OZEMPIC) 0.25 mg, subcutaneous, Weekly    tacrolimus (Protopic) 0.1 % ointment Use on light areas on skin, thin layer, in evening    tretinoin (Retin-A) 0.05 % cream Apply a small 1/2 pea sized amount to clean dry face at bedtime.  Start off 2x/week and increase as tolerated.    triamcinolone (Kenalog) 0.1 % cream APPLY 1 APPLICATION TO AFFECTED AREA TWICE DAILY.    Ventolin HFA 90 mcg/actuation inhaler 2 puffs, inhalation, Every 4 hours PRN    Vitamin D3 50 mcg (2,000 unit) tablet TAKE 2 TABLET DAILY       Allergies:  Allergies as of 06/21/2024 - Reviewed 06/21/2024   Allergen Reaction Noted    Spironolactone Angioedema 02/02/2024    Codeine Other 09/18/2023    Ketorolac Hives, Itching, and Swelling 09/18/2023    Lisinopril Angioedema 05/19/2022    Morphine Itching and  Swelling 2023    Bee venom protein (honey bee) Unknown 2023    Dulaglutide Unknown 2023    Gabapentin Unknown 2023    Psyllium husk Hives 2023    Shellfish containing products Swelling 2023    Tramadol Itching, Rash, and Other 2023       Past Medical History:    Past Medical History:   Diagnosis Date    Achilles tendinitis of left lower extremity 2023    Acute medial meniscus tear 2023    Allergic reaction 2023    Angio-edema, sequela 2023    Closed displaced fracture of fifth metatarsal bone of left foot 2023    Diverticulosis of intestine, part unspecified, without perforation or abscess without bleeding 08/15/2014    Diverticulosis    Flat feet, bilateral 2023    Personal history of other diseases of the digestive system 2014    History of diverticulitis of colon    Personal history of other diseases of the musculoskeletal system and connective tissue     Personal history of scoliosis       Past Surgical History:   Past Surgical History:   Procedure Laterality Date    ANTERIOR CRUCIATE LIGAMENT REPAIR  2014    Primary Repair Of Knee Ligament Cruciate Anterior     SECTION, CLASSIC  2014     Section    OTHER SURGICAL HISTORY  2014    Knee Arthroscopy With Medial Meniscectomy    OTHER SURGICAL HISTORY  2019    Hernia repair       Social History:   Social History     Socioeconomic History    Marital status:      Spouse name: Not on file    Number of children: Not on file    Years of education: Not on file    Highest education level: Not on file   Occupational History    Not on file   Tobacco Use    Smoking status: Former    Smokeless tobacco: Never    Tobacco comments:     Quit 2023   Substance and Sexual Activity    Alcohol use: Defer    Drug use: Defer    Sexual activity: Defer   Other Topics Concern    Not on file   Social History Narrative    Not on file     Social  Determinants of Health     Financial Resource Strain: At Risk (2023)    Received from [facility]     Financial Resource Strain     Financial Resource Strain: 2   Food Insecurity: At Risk (2023)    Received from [facility]     Food Insecurity     Food: 2   Transportation Needs: Not at Risk (2023)    Received from [facility]     Transportation Needs     Transportation: 1   Physical Activity: Insufficiently Active (2023)    Received from [facility]    Exercise Vital Sign     Days of Exercise per Week: 3 days     Minutes of Exercise per Session: 40 min   Stress: Not at Risk (2023)    Received from [facility]     Stress     Stress: 1   Social Connections: Not at Risk (2023)    Received from [facility]     Social Connections     Social Connections and Isolation: 1   Intimate Partner Violence: Not on file   Housing Stability: At Risk (2023)    Received from [facility]     Housing Stability     Housin       Family History:   Family History   Problem Relation Name Age of Onset    Diabetes Mother      Hypertension Mother      Scoliosis Mother      Crohn's disease Father      Hypertension Father      Heart disease Father      Breast cancer Sister  61    Graves' disease Sibling         Review of Systems: Review of systems completed with medical assistant intake. Please refer to this note.     Physical Exam:  BMI: 41, which is abnormal. Encouraged to lose weight and to follow up with PCP.    General: The patient is well-appearing, alert and oriented to time, place and person and has an appropriate affect.     Neurological Examination: Sensation normal, motor function normal, coordination / cerebellum normal, reflexes normal.    Cardiovascular Exam: Capillary refill normal, arterial pulses normal, no varicose veins, no edema.    Skin Exam: Skin throughout the upper and lower extremities is normal without any evidence of infection or rash.    Gait: patient ambulates with an antalgic gait.    Right Hip  Examination:  The skin is intact over the hip.    There is no tenderness over the greater trochanter.    Range of motion is full extension to 100 degrees of flexion.    The hip is stable without subluxation or dislocation.    The hip internally rotates to 15 degrees and externally rotates to 45 degrees.    There is no pain with hip motion.    Left Hip Examination:  The skin is intact over the hip.    There is no tenderness over the greater trochanter.    Range of motion is full extension to 100 degrees of flexion.    The hip is stable without subluxation or dislocation.    The hip internally rotates to 15 degrees and externally rotates to 45 degrees.    There is no pain with hip motion.    Left Knee Examination:  Examination of the left knee reveals the skin to be intact. Lateral longitudinal incision and anteromedial incision from prior ACL reconstruction.     There is a small effusion in the knee.     The alignment of the knee is Varus.     This deformity is not correctable.     There is tenderness to palpation over the joint line.     There is significant quadriceps atrophy.     Range of Motion: 15 flexion to 110 degrees of flexion.     The knee is stable to varus-valgus stress and anterior-posterior stress.      There is severe grinding with range of motion.     There is severe patellofemoral crepitus.     Right Knee Examination:  Examination of the right knee reveals the skin to be intact.      There is no obvious swelling.     There is a no effusion in the knee.      The alignment of the knee is normal.     There is no tenderness to palpation over the joint line.     There is no significant quadriceps atrophy.     Range of motion is 10 flexion to 110 degrees of flexion.     The knee is stable to varus-valgus stress and anterior-posterior stress.      There is moderate grinding with range of motion.     There is moderate patellofemoral crepitus.       Imaging:  Radiographs were personally reviewed today. There  is evidence of severe BILATERAL knee osteoarthritis with MEDIAL bone on bone apposition. There are retained staple implants in left distal femur.     Impression and Plan:  54 y.o. female here for follow-up evaluation of BILATERAL knee.    Osteoarthritis of both knees, unspecified osteoarthritis type    Primary osteoarthritis of left knee    Primary osteoarthritis of right knee    I have discussed the options in detail with the patient. We have discussed anti-inflammatory medication, activity modification, physical therapy, corticosteroid injections, viscosupplementation injections, partial knee replacement surgery and total knee replacement surgery.    The risks and benefits of all these treatment options have been discussed in detail.     The patient has tried at least 3 months of the above conservative treatments and continues to have disabling pain, impaired activities of daily living and worsened quality of life.  Reviewed the surgical optimization steps to optimize their chances for a successful joint replacement surgery.      Currently their BMI is 41.  They would need to lose significant weight before being scheduled for surgery. Discussed that obesity is a risk factor for continued progression of osteoarthritis. Each pound of weight loss offloads their hip and knee joints by 3-6 pounds.  The most effective of these options is weight loss mainly through restricting caloric intake.  A referral to a nutritionist was offered.     The patient has previously completed physical therapy.  She maintains a very active lifestyle of swimming and her outdoor gardening/harvesting hobbies.  Patient will continue their home exercise program. Strategies for pain management using over-the-counter anti-inflammatory medications reviewed.  The patient elects for a steroid injection, which was provided according to procedure note below. Encouraged them to maintain range of motion and strength around the knee joints.  They will  continue to implement these strategies in addressing their pain.       Recommend the patient continue optimizing nonsurgical treatment interventions as outlined above for management of their knee arthritis.  I would be happy to see them again at any point to discuss surgery if they are more optimized or to review progress with nonsurgical treatment of arthritis. The patient verbalizes understanding with the recommendations and treatment plan as outlined above and is in agreement.  Questions were addressed.    RTC: As needed    X-rays at next visit: As needed    Large Joint Injection 46567: Knee  Consent given by: Patient  Timeout: Immediately prior to procedure the correct patient, procedure, and site was verified. Sterile gloves and semi-sterile technique were used.   Indications: Knee pain and joint swelling.   Location: BILATERAL knee  Needle size: 22 G  Approach: Lateral    Medications administered: Please refer to medical assistant note for lot number and expiration date.   Subcutaneous   4 ml of 1% lidocaine     Deep   4 ml of 1% lidocaine   4 mL 0.5% marcaine   2 mL of 40 mg kenalog     Patient tolerance: Dressing applied. Patient tolerated the procedure well with no immediate complications.    L Inj/Asp: bilateral knee on 6/21/2024 1:17 PM  Indications: pain and joint swelling  Details: 22 G needle, lateral approach  Medications (Right): 80 mg triamcinolone acetonide 40 mg/mL; 4 mL ropivacaine 5 mg/mL (0.5 %); 8 mL lidocaine 10 mg/mL (1 %)  Medications (Left): 80 mg triamcinolone acetonide 40 mg/mL; 4 mL ropivacaine 5 mg/mL (0.5 %); 8 mL lidocaine 10 mg/mL (1 %)  Outcome: tolerated well, no immediate complications  Procedure, treatment alternatives, risks and benefits explained, specific risks discussed. Consent was given by the patient. Immediately prior to procedure a time out was called to verify the correct patient, procedure, equipment, support staff and site/side marked as required. Patient was prepped  and draped in the usual sterile fashion.             _____________________  Neetu Rios MD   Attending Orthopaedic Surgeon  [facility]    [facility]    This office note was transcribed with dictation software.  Please excuse any typographical errors, program misunderstandings leading to inadvertent insertions or deletions of inappropriate wording, pronoun errors and other unintentional transcription errors not noticed on proof-reading.

## 2024-06-25 ENCOUNTER — APPOINTMENT (OUTPATIENT)
Dept: OPHTHALMOLOGY | Facility: CLINIC | Age: 54
End: 2024-06-25
Payer: MEDICAID

## 2024-06-25 DIAGNOSIS — H52.223 REGULAR ASTIGMATISM OF BOTH EYES: ICD-10-CM

## 2024-06-25 DIAGNOSIS — H52.13 MYOPIA OF BOTH EYES: ICD-10-CM

## 2024-06-25 DIAGNOSIS — E11.9 TYPE 2 DIABETES MELLITUS WITHOUT COMPLICATION, WITHOUT LONG-TERM CURRENT USE OF INSULIN (MULTI): Primary | ICD-10-CM

## 2024-06-25 DIAGNOSIS — H52.4 PRESBYOPIA: ICD-10-CM

## 2024-06-25 PROCEDURE — 99214 OFFICE O/P EST MOD 30 MIN: CPT | Performed by: OPTOMETRIST

## 2024-06-25 PROCEDURE — 92015 DETERMINE REFRACTIVE STATE: CPT | Performed by: OPTOMETRIST

## 2024-06-25 RX ORDER — NIFEDIPINE 60 MG/1
1 TABLET, EXTENDED RELEASE ORAL
COMMUNITY
Start: 2024-05-24

## 2024-06-25 ASSESSMENT — REFRACTION_WEARINGRX
OS_SPHERE: -1.50
OS_ADD: +2.25
OS_AXIS: 019
OS_AXIS: 066
OD_CYLINDER: -1.50
OD_CYLINDER: -1.00
OS_ADD: +2.00
OD_ADD: +2.25
OD_AXIS: 019
OD_SPHERE: -1.25
OS_CYLINDER: -0.50
OS_CYLINDER: -0.50
OD_SPHERE: -1.75
OD_ADD: +2.00
OD_AXIS: 023
OS_SPHERE: -1.25

## 2024-06-25 ASSESSMENT — VISUAL ACUITY
OD_CC: 20/20-1
METHOD: SNELLEN - LINEAR
OS_CC: 20/20
CORRECTION_TYPE: GLASSES

## 2024-06-25 ASSESSMENT — EXTERNAL EXAM - LEFT EYE: OS_EXAM: NORMAL

## 2024-06-25 ASSESSMENT — TONOMETRY
OD_IOP_MMHG: 19
OS_IOP_MMHG: 20
IOP_METHOD: GOLDMANN APPLANATION

## 2024-06-25 ASSESSMENT — ENCOUNTER SYMPTOMS
CONSTITUTIONAL NEGATIVE: 0
RESPIRATORY NEGATIVE: 0
PSYCHIATRIC NEGATIVE: 0
MUSCULOSKELETAL NEGATIVE: 0
GASTROINTESTINAL NEGATIVE: 0
HEMATOLOGIC/LYMPHATIC NEGATIVE: 0
ALLERGIC/IMMUNOLOGIC NEGATIVE: 0
ENDOCRINE NEGATIVE: 0
EYES NEGATIVE: 1
CARDIOVASCULAR NEGATIVE: 0
NEUROLOGICAL NEGATIVE: 0

## 2024-06-25 ASSESSMENT — CONF VISUAL FIELD
OS_INFERIOR_TEMPORAL_RESTRICTION: 0
METHOD: COUNTING FINGERS
OS_INFERIOR_NASAL_RESTRICTION: 0
OD_INFERIOR_TEMPORAL_RESTRICTION: 0
OD_SUPERIOR_TEMPORAL_RESTRICTION: 0
OS_NORMAL: 1
OS_SUPERIOR_TEMPORAL_RESTRICTION: 0
OD_SUPERIOR_NASAL_RESTRICTION: 0
OD_INFERIOR_NASAL_RESTRICTION: 0
OD_NORMAL: 1
OS_SUPERIOR_NASAL_RESTRICTION: 0

## 2024-06-25 ASSESSMENT — CUP TO DISC RATIO
OD_RATIO: .25
OS_RATIO: .25

## 2024-06-25 ASSESSMENT — REFRACTION
OD_SPHERE: -1.50
OD_ADD: +2.25
OD_AXIS: 023
OS_AXIS: 066
OS_SPHERE: -0.75
OD_CYLINDER: -1.25
OS_CYLINDER: -0.50
OS_ADD: +2.25

## 2024-06-25 ASSESSMENT — REFRACTION_MANIFEST
OS_SPHERE: -1.00
OS_ADD: +2.25
OS_CYLINDER: -0.50
OD_CYLINDER: -1.25
OD_ADD: +2.25
OD_SPHERE: -1.50
OD_AXIS: 040
OS_AXIS: 090

## 2024-06-25 ASSESSMENT — EXTERNAL EXAM - RIGHT EYE: OD_EXAM: NORMAL

## 2024-06-25 ASSESSMENT — SLIT LAMP EXAM - LIDS
COMMENTS: NORMAL, TR MGD
COMMENTS: NORMAL, TR MGD

## 2024-06-25 NOTE — LETTER
June 25, 2024    Alexsandra Patel MD  [address] [address]  [facility] [address]     Patient: Nupur Ramires   YOB: 1970   Date of Visit: 6/25/2024       Dear Dr. Alexsandra Patel MD:    Thank you for referring Nupur Ramires to me for evaluation. Here is my assessment and plan of care:    Assessment/Plan:  Type 2 diabetes mellitus without complication, without long-term current use of insulin (Multi)  The patient has diabetes without any evidence of retinopathy.  The patient was advised to maintain tight glucose control, tight blood pressure control, and favorable levels of cholesterol, low density lipoprotein, and high density lipoproteins.  Follow up in one year was recommended.    Myopia of both eyes  Regular astigmatism of both eyes  Presbyopia  New spec rx released today per patient request. Ocular health wnl for age OU. Monitor 1 year or sooner prn. Refraction billed today.  Below you will find my full exam findings. If you have questions, please do not hesitate to call me. I look forward to following Nupur along with you.         Sincerely,        Carlyn Kidd, OD        CC:   No Recipients        Base Eye Exam       Visual Acuity (Snellen - Linear)         Right Left    Dist cc 20/20-1 20/20      Correction: Glasses              Tonometry (Goldmann Applanation, 8:51 AM)         Right Left    Pressure 19 20              Pupils         Pupils    Right PERRL, No APD    Left PERRL, No APD              Visual Fields (Counting fingers)         Left Right     Full Full              Extraocular Movement         Right Left     Full, Ortho Full, Ortho              Neuro/Psych       Oriented x3: Yes    Mood/Affect: Normal              Dilation       Both eyes: 1% Mydriacyl & 2.5% Rolf  @ 8:52 AM                  Slit Lamp and Fundus Exam       External Exam         Right Left    External Normal Normal              Slit Lamp Exam         Right Left    Lids/Lashes Normal, tr MGD Normal, tr MGD     Conjunctiva/Sclera White and quiet White and quiet    Cornea Clear Clear    Anterior Chamber Deep and quiet Deep and quiet    Iris Round and reactive, no NVI Round and reactive, no NVI    Lens tr NS tr NS    Anterior Vitreous Clear Clear              Fundus Exam         Right Left    Posterior Vitreous Normal Normal    Disc Normal, no NVD Normal, no NVD    C/D Ratio .25 .25    Macula Normal, no retinopathy Normal, no retinopathy    Vessels Normal Normal    Periphery Normal Normal                  Refraction       Wearing Rx         Sphere Cylinder Axis Add    Right -1.75 -1.50 023 +2.00    Left -1.50 -0.50 066 +2.00      Type: Old Bifocal              Wearing Rx #2         Sphere Cylinder Axis Add    Right -1.25 -1.00 019 +2.25    Left -1.25 -0.50 019 +2.25      Type: 2023 prog              Manifest Refraction         Sphere Cylinder Mallory Dist VA Add Near VA    Right -1.50 -1.25 040 20/20 +2.25 J1+    Left -1.00 -0.50 090 20/20 +2.25 J1+              Cycloplegic Refraction         Sphere Cylinder Axis Add    Right -1.50 -1.25 023 +2.25    Left -0.75 -0.50 066 +2.25              Final Rx         Sphere Cylinder Axis Add    Right -1.75 -1.25 023 +2.25    Left -1.00 -0.50 066 +2.25      Expiration Date: 6/25/2026

## 2024-06-25 NOTE — PROGRESS NOTES
Assessment/Plan   Diagnoses and all orders for this visit:  Type 2 diabetes mellitus without complication, without long-term current use of insulin (Multi)  The patient has diabetes without any evidence of retinopathy.  The patient was advised to maintain tight glucose control, tight blood pressure control, and favorable levels of cholesterol, low density lipoprotein, and high density lipoproteins.  Follow up in one year was recommended.    Myopia of both eyes  Regular astigmatism of both eyes  Presbyopia  New spec rx released today per patient request. Ocular health wnl for age OU. Monitor 1 year or sooner prn. Refraction billed today.

## 2024-06-27 ENCOUNTER — TELEPHONE (OUTPATIENT)
Dept: OBSTETRICS AND GYNECOLOGY | Facility: CLINIC | Age: 54
End: 2024-06-27
Payer: MEDICAID

## 2024-06-27 NOTE — TELEPHONE ENCOUNTER
----- Message from Silvina De Souza MD MPH sent at 6/26/2024 10:58 AM EDT -----  Regarding: FW: Uterine issue  Contact: [phone]  You can add her to clinic tomorrow. (Not Friday).  ----- Message -----  From: Shanelle Marie RN  Sent: 6/26/2024   9:19 AM EDT  To: Silvina De Souza MD MPH  Subject: FW: Uterine issue                                Do you want to see her this week or have her try something like Lotrisone cream?  ----- Message -----  From: Nupur Ramires  Sent: 6/26/2024   4:31 AM EDT  To: Bob Desouza[address] [place] [address] Clinical Support Staff  Subject: Uterine issue                                    Hello Please forward this message to   I'm experiencing extreme discomfort in perineum and have not EVER had an issue like this. It was extremely hot last week, spent a lot of time in swimsuit, swimming ...  I have attempted to utilize Vaseline, and all the things available to help.with irritation but now I'm unable to rest, sit down comfortably. May I please have her nurse call me or make an appointment?. Early morning Appointment work best. I can go to [place] location in [place] as well. Please Help  I don't want to go into holiday in discomfort/pain

## 2024-06-27 NOTE — TELEPHONE ENCOUNTER
Sent pt a My Chart message yesterday offering her an appt for today with no response. Called her today and she reports she went to an Urgent Care and was seen and received a cream for treatment. No appt needed with Dr De Souza.

## 2024-07-08 ENCOUNTER — APPOINTMENT (OUTPATIENT)
Dept: INTEGRATIVE MEDICINE | Facility: CLINIC | Age: 54
End: 2024-07-08
Payer: MEDICAID

## 2024-07-09 ENCOUNTER — APPOINTMENT (OUTPATIENT)
Dept: INTEGRATIVE MEDICINE | Facility: CLINIC | Age: 54
End: 2024-07-09
Payer: MEDICAID

## 2024-07-10 ENCOUNTER — ALLIED HEALTH (OUTPATIENT)
Dept: INTEGRATIVE MEDICINE | Facility: CLINIC | Age: 54
End: 2024-07-10
Payer: MEDICAID

## 2024-07-10 DIAGNOSIS — M99.04 SACROILIAC JOINT SOMATIC DYSFUNCTION: ICD-10-CM

## 2024-07-10 DIAGNOSIS — M47.816 LUMBAR SPONDYLOSIS: ICD-10-CM

## 2024-07-10 DIAGNOSIS — M99.03 SEGMENTAL AND SOMATIC DYSFUNCTION OF LUMBAR REGION: Primary | ICD-10-CM

## 2024-07-10 DIAGNOSIS — M54.9 DORSALGIA: ICD-10-CM

## 2024-07-10 DIAGNOSIS — M54.17 LUMBOSACRAL RADICULITIS: ICD-10-CM

## 2024-07-10 DIAGNOSIS — M99.02 SOMATIC DYSFUNCTION OF THORACIC REGION: ICD-10-CM

## 2024-07-10 PROCEDURE — 98941 CHIROPRACT MANJ 3-4 REGIONS: CPT | Performed by: CHIROPRACTOR

## 2024-07-10 ASSESSMENT — ENCOUNTER SYMPTOMS
CHEST TIGHTNESS: 0
DIARRHEA: 0
TROUBLE SWALLOWING: 0
CONFUSION: 0
FREQUENCY: 0
CONSTIPATION: 0
ABDOMINAL PAIN: 0
FATIGUE: 0
JOINT SWELLING: 0
FEVER: 0

## 2024-07-10 NOTE — PROGRESS NOTES
"Subjective   Patient ID: Nupur Ramires is a 54 y.o. female who presents today for upper and lower back pain.    8/15 [facility]    HPI -the patient is doing much better, especially when compared to her last visit.  She has started counseling to address emotional concerns which she feels has been long overdue.  She is endorsing left greater than right lower back pain which is described as achy and sore.  She has been able to resume some exercise and will soon be starting nutritional counseling.    Review of Systems   Constitutional:  Negative for fatigue and fever.   HENT:  Negative for trouble swallowing.    Eyes:  Negative for visual disturbance.   Respiratory:  Negative for chest tightness.    Cardiovascular:  Negative for chest pain and leg swelling.   Gastrointestinal:  Negative for abdominal pain, constipation and diarrhea.   Genitourinary:  Negative for frequency.   Musculoskeletal:  Negative for joint swelling.   Neurological:  Negative for syncope.   Psychiatric/Behavioral:  Negative for confusion.    All other systems reviewed and are negative.    Relevant Imaging:  MRI of lumbar spine from 11/20/2021: \"Mild to moderate rotatory levoconvex scoliosis and mild grade 1  anterolisthesis at L5-S1 associated with multilevel spondylosis most  notably minor disc bulging at L4-5 and moderate to advanced posterior   facet arthropathy at L4-5 and L5-S1 concordant with x-ray lumbar spine  series 09/22/2021.\"     Objective     The patient is alert and oriented x 3. Cranial nerves II-XII are grossly intact.  No difficulty with transitional movements is observed.  Rounded shoulders. Elevated right shoulder.  Increased thoracic kyphosis. Elevated left iliac crest.  Leg length is symmetric.  Gait is altered due to knee osteoarthritis.    Moderate hypertonicity, spasm and tenderness is present in the right levator scapulae, right rhomboids, thoracic paraspinals, lumbar paraspinals, quadratus lumborum, upper gluteals, " piriformis, R>L.   (There appears to be a lipoma present in the left paraspinal musculature.)    Segmental joint dysfunction was assessed with motion palpation and is identified in the following areas:  Cervical:   Thoracic: T4/5, T5/6  Lumbopelvic: L4/5, L5/S1 Left SI, Right SI.  PI on right.    Reduced severity related to objective findings.    Assessment/Plan   See diagnoses.    (07/18/2022 CR: The patient's initial presentation is consistent with lumbar spondylosis, lumbosacral radiculitis, and mechanical joint dysfunction. Her condition is complicated by her idiopathic scoliosis. Based on the chronicity of her complaints I do feel that a trial of acupuncture alongside chiropractic care will help optimize the patient's outcome.)     Today's treatment:  Low force manipulation applied to the: right >left SI joint.  Pelvic blocking applied to left SI joint.  Flexion-distraction applied to the lumbar spine  Low force manipulation applied to the involved thoracic segments    Neuromuscular re-education: IDN applied to lumbar paraspinals,  gluteals (8 in/out).  7 minutes.    Treatment Plan:   The patient tolerated today's treatment with little or no additional discomfort and was instructed to contact the office for questions or concerns. Return within 4 weeks, sooner if needed.    Please note: Voice to text software was used when completing this note. While the note was proofread, portions may include grammatical errors. Please contact me with any questions/concerns as it relates to these types of errors.

## 2024-08-07 ENCOUNTER — APPOINTMENT (OUTPATIENT)
Dept: INTEGRATIVE MEDICINE | Facility: CLINIC | Age: 54
End: 2024-08-07
Payer: MEDICAID

## 2024-08-14 ENCOUNTER — ALLIED HEALTH (OUTPATIENT)
Dept: INTEGRATIVE MEDICINE | Facility: CLINIC | Age: 54
End: 2024-08-14
Payer: MEDICAID

## 2024-08-14 DIAGNOSIS — M99.02 SOMATIC DYSFUNCTION OF THORACIC REGION: ICD-10-CM

## 2024-08-14 DIAGNOSIS — M54.9 DORSALGIA: ICD-10-CM

## 2024-08-14 DIAGNOSIS — M79.10 MYALGIA, UNSPECIFIED SITE: ICD-10-CM

## 2024-08-14 DIAGNOSIS — M99.04 SACROILIAC JOINT SOMATIC DYSFUNCTION: ICD-10-CM

## 2024-08-14 DIAGNOSIS — M99.03 SEGMENTAL AND SOMATIC DYSFUNCTION OF LUMBAR REGION: Primary | ICD-10-CM

## 2024-08-14 DIAGNOSIS — M54.17 LUMBOSACRAL RADICULITIS: ICD-10-CM

## 2024-08-14 DIAGNOSIS — M47.816 LUMBAR SPONDYLOSIS: ICD-10-CM

## 2024-08-14 PROCEDURE — 98941 CHIROPRACT MANJ 3-4 REGIONS: CPT | Performed by: CHIROPRACTOR

## 2024-08-14 NOTE — PROGRESS NOTES
Subjective   Patient ID: Nupur Ramires is a 54 y.o. female who presents August 14, 2024 for upper back and low back pain.    (9/15) VPCY    Today, the patient rates their degree of pain as a 4 out of 10 on the numeric pain rating scale.     Nupur returns for continued treatment of upper back and low back pain. She states that she has noted significant improvement in symptoms from treatment with Dr. Cano. She states that she continues to have upper back and low back pain. She states that she gets the most relief from dry needling and flexion/distraction. Denies any associated symptoms or change in medical history since last visit and will follow up in 2 weeks.            Objective   Physical Exam  Constitutional:       General: She is not in acute distress.     Appearance: Normal appearance.       HENT:      Head: Normocephalic and atraumatic.   Neurological:      General: No focal deficit present.      Mental Status: She is alert and oriented to person, place, and time.      Cranial Nerves: No dysarthria or facial asymmetry.      Sensory: Sensation is intact.      Motor: Motor function is intact.      Coordination: Coordination is intact.      Gait: Gait is intact.   Psychiatric:         Attention and Perception: Attention normal.         Mood and Affect: Mood normal.         Speech: Speech normal.         Behavior: Behavior is cooperative.         Palpation of the following region(s) revealed:    Thoracic: Thoracic paraspinals bilateral, hypertonicity and tenderness.  Rhomboids bilateral, hypertonicity and tenderness.  Lumbar: Lumbar paraspinals bilateral, hypertonicity and tenderness.  Quadratus lumborum bilateral, hypertonicity and tenderness.  Gluteal bilateral, hypertonicity and tenderness.  Piriformis bilateral, hypertonicity and tenderness.        Segmental Joint(s): Segmental joint dysfunction was assessed with motion palpation and is identified in the following areas:  Thoracic : T4, T5, and  T6  Lumbopelvic / Sacral SIJ : L4, L5, L5/S1, R SIJ, and L SIJ      Assessment/Plan   Today's Treatment Included: Chiropractic manipulation to the  Segmental Joint(s) Lumbopelvic/Sacral SIJ : L4, L5, L5/S1, R SIJ, and L SIJ Segmental Joint(s) Thoracic : T4, T5, and T6   Treatment Techniques Used : Pelvic drop table technique, Flexion-distraction technique, and Low force  Soft-Tissue manipulation  Integrative Dry Needling (IDN) - Needles in / out:  8. LS paraspinals and gluteals bilateral.    Soft-tissue mobilization was performed in the following areas:     Thoracic Paraspinal mm. bilateral and Rhomboids bilateral  Lumbar Paraspinal mm. bilateral, Quadratus Lumborum bilateral, Gluteal mm.  bilateral, and Piriformis bilateral            The patient tolerated today's treatment with little or no additional discomfort and was instructed to contact the office for questions or concerns.

## 2024-08-30 ENCOUNTER — APPOINTMENT (OUTPATIENT)
Dept: INTEGRATIVE MEDICINE | Facility: CLINIC | Age: 54
End: 2024-08-30
Payer: MEDICAID

## 2024-08-30 DIAGNOSIS — M54.9 DORSALGIA: ICD-10-CM

## 2024-08-30 DIAGNOSIS — M99.03 SEGMENTAL AND SOMATIC DYSFUNCTION OF LUMBAR REGION: Primary | ICD-10-CM

## 2024-08-30 DIAGNOSIS — M79.10 MYALGIA, UNSPECIFIED SITE: ICD-10-CM

## 2024-08-30 DIAGNOSIS — M99.02 SOMATIC DYSFUNCTION OF THORACIC REGION: ICD-10-CM

## 2024-08-30 DIAGNOSIS — M54.17 LUMBOSACRAL RADICULITIS: ICD-10-CM

## 2024-08-30 DIAGNOSIS — M47.816 LUMBAR SPONDYLOSIS: ICD-10-CM

## 2024-08-30 DIAGNOSIS — M99.04 SACROILIAC JOINT SOMATIC DYSFUNCTION: ICD-10-CM

## 2024-08-30 PROCEDURE — 98941 CHIROPRACT MANJ 3-4 REGIONS: CPT | Performed by: CHIROPRACTOR

## 2024-08-30 NOTE — PROGRESS NOTES
Subjective   Patient ID: Nupur Ramires is a 54 y.o. female who presents August 30, 2024 for upper back and low back pain.    (10/15) VPCY    Today, the patient rates their degree of pain as a 4 out of 10 on the numeric pain rating scale.     Nupur returns for continued treatment of upper back and low back pain. She states that she had increased left sided hip discomfort after last visit that lasted until today. She states that her back felt slightly better after last visit. She states that the pelvic blocking had helped reduce hip discomfort. Denies any associated symptoms or change in medical history since last visit and will follow up in 2 weeks.            Objective   Physical Exam  Constitutional:       General: She is not in acute distress.     Appearance: Normal appearance.       HENT:      Head: Normocephalic and atraumatic.   Neurological:      General: No focal deficit present.      Mental Status: She is alert and oriented to person, place, and time.      Cranial Nerves: No dysarthria or facial asymmetry.      Sensory: Sensation is intact.      Motor: Motor function is intact.      Coordination: Coordination is intact.      Gait: Gait is intact.   Psychiatric:         Attention and Perception: Attention normal.         Mood and Affect: Mood normal.         Speech: Speech normal.         Behavior: Behavior is cooperative.       Palpation of the following region(s) revealed:    Thoracic: Thoracic paraspinals bilateral, hypertonicity and tenderness.  Rhomboids bilateral, hypertonicity and tenderness.  Lumbar: Lumbar paraspinals bilateral, hypertonicity and tenderness.  Quadratus lumborum bilateral, hypertonicity and tenderness.  Gluteal bilateral, hypertonicity and tenderness.  Piriformis bilateral, hypertonicity and tenderness.        Segmental Joint(s): Segmental joint dysfunction was assessed with motion palpation and is identified in the following areas:  Thoracic : T4, T5, and T6  Lumbopelvic / Sacral  SIJ : L4, L5, L5/S1, R SIJ, and L SIJ      Assessment/Plan   Today's Treatment Included: Chiropractic manipulation to the  Segmental Joint(s) Lumbopelvic/Sacral SIJ : L4, L5, L5/S1, R SIJ, and L SIJ Segmental Joint(s) Thoracic : T4, T5, and T6   Treatment Techniques Used : Pelvic drop table technique, Flexion-distraction technique, Pelvic blocking technique, and Low force  Soft-Tissue manipulation  Integrative Dry Needling (IDN) - Needles in / out:  8. LS paraspinals and gluteals bilateral.    Soft-tissue mobilization was performed in the following areas:     Thoracic Paraspinal mm. bilateral and Rhomboids bilateral  Lumbar Paraspinal mm. bilateral, Quadratus Lumborum bilateral, Gluteal mm.  bilateral, and Piriformis bilateral            The patient tolerated today's treatment with little or no additional discomfort and was instructed to contact the office for questions or concerns.

## 2024-09-04 ENCOUNTER — APPOINTMENT (OUTPATIENT)
Dept: DERMATOLOGY | Facility: CLINIC | Age: 54
End: 2024-09-04
Payer: MEDICAID

## 2024-09-06 ENCOUNTER — APPOINTMENT (OUTPATIENT)
Dept: INTEGRATIVE MEDICINE | Facility: CLINIC | Age: 54
End: 2024-09-06
Payer: MEDICAID

## 2024-09-13 ENCOUNTER — APPOINTMENT (OUTPATIENT)
Dept: INTEGRATIVE MEDICINE | Facility: CLINIC | Age: 54
End: 2024-09-13
Payer: MEDICAID

## 2024-09-24 ENCOUNTER — APPOINTMENT (OUTPATIENT)
Dept: INTEGRATIVE MEDICINE | Facility: CLINIC | Age: 54
End: 2024-09-24
Payer: MEDICAID

## 2024-09-27 ENCOUNTER — APPOINTMENT (OUTPATIENT)
Dept: OBSTETRICS AND GYNECOLOGY | Facility: CLINIC | Age: 54
End: 2024-09-27
Payer: MEDICAID

## 2024-10-09 ENCOUNTER — LAB (OUTPATIENT)
Dept: LAB | Facility: LAB | Age: 54
End: 2024-10-09
Payer: MEDICAID

## 2024-10-09 ENCOUNTER — APPOINTMENT (OUTPATIENT)
Dept: INTEGRATIVE MEDICINE | Facility: CLINIC | Age: 54
End: 2024-10-09
Payer: MEDICAID

## 2024-10-09 ENCOUNTER — OFFICE VISIT (OUTPATIENT)
Dept: OBSTETRICS AND GYNECOLOGY | Facility: CLINIC | Age: 54
End: 2024-10-09
Payer: MEDICAID

## 2024-10-09 VITALS — SYSTOLIC BLOOD PRESSURE: 155 MMHG | BODY MASS INDEX: 41.52 KG/M2 | DIASTOLIC BLOOD PRESSURE: 88 MMHG | WEIGHT: 227 LBS

## 2024-10-09 DIAGNOSIS — N32.81 OAB (OVERACTIVE BLADDER): ICD-10-CM

## 2024-10-09 DIAGNOSIS — Z11.3 SCREEN FOR STD (SEXUALLY TRANSMITTED DISEASE): ICD-10-CM

## 2024-10-09 DIAGNOSIS — N81.6 POP-Q STAGE 2 RECTOCELE: ICD-10-CM

## 2024-10-09 DIAGNOSIS — N89.8 VAGINAL DISCHARGE: ICD-10-CM

## 2024-10-09 DIAGNOSIS — Z11.3 SCREEN FOR STD (SEXUALLY TRANSMITTED DISEASE): Primary | ICD-10-CM

## 2024-10-09 LAB
HBV SURFACE AG SERPL QL IA: NONREACTIVE
HCV AB SER QL: NONREACTIVE
HIV 1+2 AB+HIV1 P24 AG SERPL QL IA: NONREACTIVE
TREPONEMA PALLIDUM IGG+IGM AB [PRESENCE] IN SERUM OR PLASMA BY IMMUNOASSAY: NONREACTIVE

## 2024-10-09 PROCEDURE — 87205 SMEAR GRAM STAIN: CPT | Performed by: OBSTETRICS & GYNECOLOGY

## 2024-10-09 PROCEDURE — 87661 TRICHOMONAS VAGINALIS AMPLIF: CPT

## 2024-10-09 PROCEDURE — 87389 HIV-1 AG W/HIV-1&-2 AB AG IA: CPT

## 2024-10-09 PROCEDURE — 99214 OFFICE O/P EST MOD 30 MIN: CPT | Performed by: OBSTETRICS & GYNECOLOGY

## 2024-10-09 PROCEDURE — 86780 TREPONEMA PALLIDUM: CPT

## 2024-10-09 PROCEDURE — 87591 N.GONORRHOEAE DNA AMP PROB: CPT

## 2024-10-09 PROCEDURE — 87340 HEPATITIS B SURFACE AG IA: CPT

## 2024-10-09 PROCEDURE — 87491 CHLMYD TRACH DNA AMP PROBE: CPT

## 2024-10-09 PROCEDURE — 3079F DIAST BP 80-89 MM HG: CPT | Performed by: OBSTETRICS & GYNECOLOGY

## 2024-10-09 PROCEDURE — 86803 HEPATITIS C AB TEST: CPT

## 2024-10-09 PROCEDURE — 3077F SYST BP >= 140 MM HG: CPT | Performed by: OBSTETRICS & GYNECOLOGY

## 2024-10-09 PROCEDURE — 36415 COLL VENOUS BLD VENIPUNCTURE: CPT

## 2024-10-09 ASSESSMENT — ENCOUNTER SYMPTOMS
CARDIOVASCULAR NEGATIVE: 1
ENDOCRINE NEGATIVE: 1
CONSTITUTIONAL NEGATIVE: 1
PSYCHIATRIC NEGATIVE: 1
EYES NEGATIVE: 1
NEUROLOGICAL NEGATIVE: 1
GASTROINTESTINAL NEGATIVE: 1
MUSCULOSKELETAL NEGATIVE: 1
RESPIRATORY NEGATIVE: 1

## 2024-10-09 ASSESSMENT — PAIN SCALES - GENERAL: PAINLEVEL: 0-NO PAIN

## 2024-10-09 NOTE — PATIENT INSTRUCTIONS
We discussed lifestyle changes includin) Aiming to drink around 60 oz total of fluids per day   2) Avoiding bladder irritants such as caffeine, nicotine, artificial sweeteners   3) Stop drinking fluids 3 hours before bedtime   4) Timed voids every 2-3 hours.    5) pelvic floor physical therapy

## 2024-10-09 NOTE — PROGRESS NOTES
Subjective   Nupur Ramires is a 54 y.o. who presents for sexually transmitted infection screening. Sexual history reviewed with the patient. STI exposures include {Diagnoses; std:21792}. Patient reports previous history of the following STIs: {Diagnoses; std:91493}. Current symptoms include {std sx :37787}.    No obstetric history on file.   Social History     Substance and Sexual Activity   Sexual Activity Not Currently   • Partners: Male         Objective   Physical Exam    Assessment/Plan   {Assess/PlanSmartLinks:33412}.

## 2024-10-09 NOTE — PROGRESS NOTES
[facility] Department of Urogynecology   Silvina De Souza MD, MPH   [phone]    ASSESSMENT AND PLAN:   54-year-old female being assessed for rectocele, OAB, UUI, KALA, and STD screening. Comorbidities include: HTN, vitamin D deficiency, type 2 DM, hx of abnormal mammogram, anxiety, PTSD, opiate dependence, fibromyalgia, lumbar spondylosis, and DEEPALI.    Diagnoses:  #1 STD screening  #2 Rectocele  #3 Overactive bladder  #4 Urge incontinence  #5 Stress incontinence    Plan:  STD screening  - Collected a swab to test for BV + yeast.  - Ordered labs for STD screening via blood testing.  - GC/CT trich from urine    2. Rectocele, stool pocketing  - At her last visits, the POP was not bothering her, but it has become increasingly bothersome and now interferes with her daily activities.  - POP-Q: Aa: -2, Ba: -2, C: -8, gH: 5, pB: 4, TVL: 10, Ap: +1.5, Bp: +1.5, D: X.  -  discussed splinting  - Discussed management options for prolapse including expectant management, PFPT, pessary fitting, and surgery.   - We discussed that PFPT would help strengthen the PF muscles with an overall goal to help improve the symptoms of the vaginal bulge and prevent POP from worsening over time.   - She doesn't want surgeries right now with the holidays coming up.  - She is interested in trying PFPT. PFPT requisition sent today and gave her the list of local physical therapists with their corresponding locations/contact information.    2. OAB, UUI  - Discussed that early treatments for OAB/UUI and KALA are the same, starting with lifestyle modifications. Encouraged her to drink 60-70 oz of total fluids per day and limit fluid intake at least 3 hours prior to bedtime. Discussed bladder irritants (e.g. caffeine, nicotine, and artificial sweeteners).  - If she feels like she is urinating a lot during the day, encouraged her to go every hour if that's what she feels like she needs to do, but set an alarm and go that often. After a couple of  days, push it back by 15 minutes in an effort to retrain her bladder. Then, progressively push it back until she can hold her bladder for 2-3 hours.  - Discussed PFPT can also aid these symptoms.  - We discussed the next step being a medication to relax the bladder and that there are further treatments down the line.    3. KALA  - Discussed that early treatments for OAB/UUI and KALA are the same, starting with lifestyle modifications. Encouraged her to drink 60-70 oz of total fluids per day and limit fluid intake at least 3 hours prior to bedtime. Discussed bladder irritants (e.g. caffeine, nicotine, and artificial sweeteners).  - Discussed PFPT can also aid these symptoms.    Follow-up with Dr. De Souza in 4 months to assess efficacy and progress with PFPT.    Scribe Attestation  By signing my name below, IHarpreet Scribe, attest that this documentation has been prepared under the direction and in the presence of Silvina De Souza MD MPH on 10/09/2024 at 9:31 AM.    Problem List Items Addressed This Visit    None  Visit Diagnoses       Screen for STD (sexually transmitted disease)    -  Primary    Relevant Orders    C. trachomatis / N. gonorrhoeae, Amplified    HIV 1/2 Antigen/Antibody Screen with Reflex to Confirmation (Completed)    Hepatitis B Surface Antigen (Completed)    Hepatitis C Antibody (Completed)    Syphilis Screen with Reflex (Completed)    Trichomonas vaginalis, Amplified    OAB (overactive bladder)        POP-Q stage 2 rectocele        Relevant Orders    Referral to Physical Therapy    Vaginal discharge        Relevant Orders    Vaginitis Gram Stain For Bacterial Vaginosis + Yeast           I spent a total of 30 minutes in face to face and non face to face time.     I Dr. De Souza, personally performed the services described in the documentation as scribed in my presence and confirm it is both complete and accurate.  Silvina De Souza MD, MPH, FACOG       Silvina De Souza MD, MPH, FACOG      Established    HISTORY OF PRESENT ILLNESS:   54-year-old female presenting for an increasingly bothersome rectocele, urinary leakage, and STD screening.     Record Review:   - 9/25/2024 Dr. [facility] note RE: Type 2 DM, chronic low back pain without sciatica, HTN, Class III Obesity - Keep working on plant-based diet, weight loss. Increase strength training and pilates as tolerated. Manage pain w/ acup and chiropractor. Supplements/medications: Francesca & turmeric, consider GLP-1 if more affordable in the future.   - 1/9/2024 Dr. De Souza note RE: Labial cyst (resolved) - s/p excision of labial cyst on 12/9/2022. Well-healed. No issues. Myofascial abdominal pain - Can use heat, ice, massage, and ibuprofen for pain mgmt. If sxs don't improve, will refer to PT. General health - BP = 195/82 today. Retake at end of visit was 140/72. Reviewed she may have white coat HTN. Ordered mammogram.  - 1/20/23 Dr. Silvina De Souza note reviewed: S/p excision of labial cyst on 12/9/22. She was healing well without issue. Asymptomatic POP - Decided to not treat as she had no sxs.   - 12/16/2022 Dr. De Souza note RE: Labial cyst - S/p excision of labial cyst on 12/9/2022. Keflex 500 mg to be taken every 8 hrs x1 week as she did not some white-yellowish colored drainage + pain at incision site. Denies allergy to antibiotics. Counseled she may take sitz baths now and stitches will dissolve on their own. Discussed benign surgical pathology results. Rx refill Oxycodone 5 mg for breakthrough pain. Continue to alternate with Tylenol and Motrin. Stage 2 POP - Not bothersome; continue observation. Breast health - Ordered mammogram.  - 12/9/2022 Labial cyst excision with Dr. De Souza.  - 10/21/2022 Dr. De Souza note RE: Vulvar cyst/lesion - Plan for excision under anesthesia. Reviewed r/b/a and consent obtained. Case request sent. Needs Pap - to be done under anesthesia. Stage 2 POP - not bothersome at this time;  continue observation.  - 10/6/2022 BRUNO Yi-CNP note RE: Labial inclusion cyst - Plan for surgical excision with Dr. De Souza. Incontinence - Not interested in taking medication now and doesn't find sxs bothersome. Will continue to manage drinking habits and PFPT.    Prolapse Symptoms:  - The previously diagnosed prolapse is becoming more bothersome for her.  - About a month ago, she had a bowel movement, and when she got up she felt the bulge.  - She pushed the bulge back in.  - Since then, she's been watching what she eats and emptying her bladder more frequently.  - She notices the POP when she's lifting weights and when she's swimming - not all the time, but she's in the pool 3x/week and she wants to continue to do those things.  - She doesn't want surgery right now with the holidays coming up.   - She is interested in PFPT.     Urinary Symptoms:   - She does leak urine and wears a pad at night.  - She urinates all throughout the day and has to run to the bathroom with urgency.  - She wears a panty liner right now and doesn't change it until the end of the night, then she puts on an incontinence pad to sleep.  - She wakes up to urinate at night and can't necessarily make it to the bathroom in time.  - She is waking up with nocturia approximately 5-6x, and it's been like that for awhile.  - She is struggling to get a good night's rest.  - She leaks urine when she starts lifting weights. This happened yesterday.  - She is not getting UTIs or bladder infections.  - She has stopped drinking diet soda within the last month.    Bowel Symptoms:   - She denies constipation or having to push/strain to have a bowel movement.  - She denies any accidents with her bowels.  - She has had problems with feeling like stool gets stuck in a pocket. When that happens, she has a smoothie, and she eats yogurt every AM, which has overall improved the nature of her bowel movements.    Vaginal Symptoms:  - She denies any  strange discharge, itching, or irritation.    OBGYN History and Sexual Activity:   - She has 5 children.  - Her youngest child is 18; she had her in , which is when she had her partial hysterectomy.  - She hasn't been sexually active but would like to remain sexually active.  - Her older daughters told her that her ex has been in and out of the hospital for different STDs, and she requests STD screening at this visit, stating that she wants to be tested for everything. She requests a urine screening -AND- blood test.  - She hasn't been with her ex in 5 years.  - She would like to eventually be sexually active.       Past Medical History:     Past Medical History:   Diagnosis Date    Achilles tendinitis of left lower extremity 2023    Acute medial meniscus tear 2023    Allergic reaction 2023    Angio-edema, sequela 2023    Arthritis April 3 2013    Closed displaced fracture of fifth metatarsal bone of left foot 2023    Diabetes mellitus (Multi)     Diverticulosis of intestine, part unspecified, without perforation or abscess without bleeding 08/15/2014    Diverticulosis    Flat feet, bilateral 2023    Personal history of other diseases of the digestive system 2014    History of diverticulitis of colon    Personal history of other diseases of the musculoskeletal system and connective tissue     Personal history of scoliosis          Past Surgical History:     Past Surgical History:   Procedure Laterality Date    ANTERIOR CRUCIATE LIGAMENT REPAIR  2014    Primary Repair Of Knee Ligament Cruciate Anterior     SECTION, CLASSIC  2014     Section    OTHER SURGICAL HISTORY  2014    Knee Arthroscopy With Medial Meniscectomy    OTHER SURGICAL HISTORY  2019    Hernia repair         Medications:     Prior to Admission medications    Medication Sig Start Date End Date Taking? Authorizing Provider   benzalkonium chloride 0.13 % towelette USE AS  DIRECTED 1/18/23  Yes Historical Provider, MD   desonide (DesOwen) 0.05 % ointment APPLY SPARINGLY TO AFFECTED AREA(S) ON THE FACE DAILY FOR ITCHING AVOID USE IN THE EYES 3/30/23  Yes DONALD Conde   EPINEPHrine 0.3 mg/0.3 mL injection syringe INJECT 0.3 ML (0.3 MG) INTO THE MUSCLE 1 TIME IF NEEDED FOR ANAPHYLAXIS FOR UP TO 4 DOSES. 4/30/24  Yes Tye Kincaid MD   ibuprofen 800 mg tablet TAKE ONE TABLET EVERY 8 HOURS AS NEEDED FOR PAIN 3/30/23  Yes DONALD Conde   NIFEdipine CC 60 mg 24 hr tablet Take 1 tablet (60 mg) by mouth early in the morning.. 5/24/24  Yes Historical Provider, MD   semaglutide (Ozempic) 0.25 mg or 0.5 mg(2 mg/1.5 mL) pen injector Inject 0.25 mg under the skin once a week.   Yes Historical Provider, MD   tacrolimus (Protopic) 0.1 % ointment Use on light areas on skin, thin layer, in evening 5/30/24  Yes Dustin Cook MD   tretinoin (Retin-A) 0.05 % cream Apply a small 1/2 pea sized amount to clean dry face at bedtime.  Start off 2x/week and increase as tolerated. 5/30/24  Yes Dustin Cook MD   triamcinolone (Kenalog) 0.1 % cream APPLY 1 APPLICATION TO AFFECTED AREA TWICE DAILY. 1/12/23  Yes Historical Provider, MD   Ventolin HFA 90 mcg/actuation inhaler Inhale 2 puffs every 4 hours if needed for wheezing or shortness of breath. 3/29/23  Yes Historical Provider, MD   Vitamin D3 50 mcg (2,000 unit) tablet TAKE 2 TABLET DAILY 3/30/23  Yes DONALD Conde   ergocalciferol, vitamin D2, 50 mcg (2,000 unit) tablet Take 2 tablets by mouth once daily. 8/17/22 10/9/24  Historical Provider, MD DELEON  Review of Systems   Constitutional: Negative.    HENT: Negative.     Eyes: Negative.    Respiratory: Negative.     Cardiovascular: Negative.    Gastrointestinal: Negative.    Endocrine: Negative.    Genitourinary:         POP sxs, more noticeable with bowel movements or working out   Musculoskeletal: Negative.    Neurological: Negative.   "  Psychiatric/Behavioral: Negative.            PHYSICAL EXAM:    /88   Wt 103 kg (227 lb)   LMP  (LMP Unknown)   BMI 41.52 kg/m²   No LMP recorded (lmp unknown). Patient is postmenopausal.    Declines chaperone for physical exam.      Well developed, well nourished, in no apparent distress.   Neurologic/Psychiatric:  Awake, Alert and Oriented times 3.  Affect normal.     GENITAL/URINARY:     External Genitalia:  The patient has normal appearing external genitalia, normal skenes and bartholins glands, and a normal hair distribution.  Her vulva is without lesions, erythema or discharge.  It is non-tender with appropriate sensation.     Urethral Meatus:  Size normal, Location normal, Lesions absent, Prolapse absent.    Urethra:  Fullness absent, Masses absent.    Bladder:  Fullness absent, Masses absent, Tenderness absent.    Vagina:  General appearance normal, Estrogen effect normal, Discharge absent, Lesions absent.     Cervix: Normal, no discharge.   Uterus:  surgically absent  Adnexa:   normal, no masses    Anus/Perineum:  Lesions absent and Masses absent normal sphincter tone, no lesions  No Hemorrhoids, Normal Perineum      Physical Exam  Genitourinary:      Genitourinary Comments: Stool palpated in the rectum. Vaginal epithelium is hypoestrogen; no abnormal discharge visualized.   POP-Q measurements were:      Aa: -2, Ba: -2, C: -8     gH: 5, pB: 4, TVL: 10     Ap: +1.5, Bp: +1.5, D: X      She does not have myofascial tenderness on exam.         Data and DIAGNOSTIC STUDIES REVIEWED      No results found for: \"URINECULTURE\", \"UAMICCOMM\"   Lab Results   Component Value Date    GLUCOSE 139 (H) 01/18/2023    CALCIUM 9.7 01/18/2023     01/18/2023    K 4.5 01/18/2023    CO2 24 01/18/2023     (H) 01/18/2023    BUN 17 01/18/2023    CREATININE 0.85 01/18/2023     Lab Results   Component Value Date    WBC 9.8 01/18/2023    HGB 12.5 01/18/2023    HCT 39.8 01/18/2023    MCV 89 01/18/2023     " 01/18/2023

## 2024-10-10 LAB
C TRACH RRNA SPEC QL NAA+PROBE: NEGATIVE
CLUE CELLS VAG LPF-#/AREA: NORMAL /[LPF]
N GONORRHOEA DNA SPEC QL PROBE+SIG AMP: NEGATIVE
NUGENT SCORE: 0
T VAGINALIS RRNA SPEC QL NAA+PROBE: NEGATIVE
YEAST VAG WET PREP-#/AREA: NORMAL

## 2024-10-11 ENCOUNTER — APPOINTMENT (OUTPATIENT)
Dept: ORTHOPEDIC SURGERY | Facility: CLINIC | Age: 54
End: 2024-10-11
Payer: MEDICAID

## 2024-10-11 ENCOUNTER — APPOINTMENT (OUTPATIENT)
Dept: INTEGRATIVE MEDICINE | Facility: CLINIC | Age: 54
End: 2024-10-11
Payer: MEDICAID

## 2024-10-11 ENCOUNTER — OFFICE VISIT (OUTPATIENT)
Dept: ORTHOPEDIC SURGERY | Facility: CLINIC | Age: 54
End: 2024-10-11
Payer: MEDICAID

## 2024-10-11 DIAGNOSIS — M17.12 PRIMARY OSTEOARTHRITIS OF LEFT KNEE: Primary | ICD-10-CM

## 2024-10-11 DIAGNOSIS — M17.11 PRIMARY OSTEOARTHRITIS OF RIGHT KNEE: ICD-10-CM

## 2024-10-11 PROCEDURE — 99213 OFFICE O/P EST LOW 20 MIN: CPT | Performed by: STUDENT IN AN ORGANIZED HEALTH CARE EDUCATION/TRAINING PROGRAM

## 2024-10-11 PROCEDURE — 20610 DRAIN/INJ JOINT/BURSA W/O US: CPT | Performed by: STUDENT IN AN ORGANIZED HEALTH CARE EDUCATION/TRAINING PROGRAM

## 2024-10-11 RX ORDER — BUPIVACAINE HYDROCHLORIDE 5 MG/ML
4 INJECTION, SOLUTION PERINEURAL
Status: COMPLETED | OUTPATIENT
Start: 2024-10-11 | End: 2024-10-11

## 2024-10-11 RX ORDER — LIDOCAINE HYDROCHLORIDE 10 MG/ML
8 INJECTION, SOLUTION INFILTRATION; PERINEURAL
Status: COMPLETED | OUTPATIENT
Start: 2024-10-11 | End: 2024-10-11

## 2024-10-11 RX ORDER — TRIAMCINOLONE ACETONIDE 40 MG/ML
80 INJECTION, SUSPENSION INTRA-ARTICULAR; INTRAMUSCULAR
Status: COMPLETED | OUTPATIENT
Start: 2024-10-11 | End: 2024-10-11

## 2024-10-11 ASSESSMENT — PAIN SCALES - GENERAL: PAINLEVEL_OUTOF10: 7

## 2024-10-11 ASSESSMENT — PAIN - FUNCTIONAL ASSESSMENT: PAIN_FUNCTIONAL_ASSESSMENT: 0-10

## 2024-10-11 ASSESSMENT — PAIN DESCRIPTION - DESCRIPTORS: DESCRIPTORS: ACHING;THROBBING

## 2024-10-11 NOTE — PROGRESS NOTES
Neetu Rios MD   Adult Reconstruction and Joint Replacement Surgery  Phone: [phone]     Fax: [phone]       Name: Nupur Ramires  Age: 54 y.o.   : 1970   Date of Visit: 10/11/2024    Follow-up Knee    CC: Follow-up BILATERAL knee     History of Present Illness:  This patient presents with 3 years of BILATERAL knee pain.     They were last seen for this problem on 24. At the last visit, the patient had bilateral knee steroid injections which worked very well but have since worn off. The patient reports she is otherwise doing well. She is working with a nutritionist to lose weight.     Patient has tried the following Corticosteroid injections . Date of last steroid injection: 24. Reports that she gets injections every 3 months for the past 2 years and they have been working well. Patient does have pain at night that makes it difficult to sleep. Patient is able to walk 2-3 blocks. Patient is currently using walker as assistive device. Primarily complains of posterior pain. Patient has difficulty with climbing stairs, walking , prolonged sitting , and walking on unlevel surfaces . The pain is significantly impacting her ability to perform activities of daily living. Patient reports no longer able to do activities such as swimming, walking long distances. 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 Denies any feelings of instability but reports that she feels clicking in her knee. She reports that she used to weigh over 400 pounds but has able to lose a significant amount of weight through physical activity and ozempic. Reports that she has had 2 recent hospitalizations for hypertensive emergency.      Focused History  PMH: Reviewed and PE/DVT: None  PSH: Reviewed , Hip/Knee replacement: None, Hip/Knee surgery: L ACL reconstruction, R meniscus repair, Anesthesia complications: None, Spine surgery: None, Surgical infection: None, and Weight loss  surgery: None  Meds: Reviewed, Current Anticoagulants: None, Weight loss medication: Ozempic, and Current Opioids: None  Allergies: Reviewed  and The patient reports no contraindications or allergies to cephalosporins, aspirin, NSAIDs or opioids, except as noted above.  FH: No family history of any bleeding or clotting disorders.  SHx: Reviewed, Occupation: Robotics teacher, Current smoker: No, quit 3 months prior, EtOH intake weekly: None, and Preferred physical activities: Swimming  Advent: no    HISTORY  PROMs   No questionnaires on file.     Past Medical History:   Diagnosis Date    Achilles tendinitis of left lower extremity 09/18/2023    Acute medial meniscus tear 09/18/2023    Allergic reaction 09/18/2023    Angio-edema, sequela 09/18/2023    Arthritis April 3 2013    Closed displaced fracture of fifth metatarsal bone of left foot 09/18/2023    Diabetes mellitus (Multi)     Diverticulosis of intestine, part unspecified, without perforation or abscess without bleeding 08/15/2014    Diverticulosis    Flat feet, bilateral 09/18/2023    Personal history of other diseases of the digestive system 07/23/2014    History of diverticulitis of colon    Personal history of other diseases of the musculoskeletal system and connective tissue     Personal history of scoliosis       Past Medical History:   Diagnosis Date    Achilles tendinitis of left lower extremity 09/18/2023    Acute medial meniscus tear 09/18/2023    Allergic reaction 09/18/2023    Angio-edema, sequela 09/18/2023    Arthritis April 3 2013    Closed displaced fracture of fifth metatarsal bone of left foot 09/18/2023    Diabetes mellitus (Multi)     Diverticulosis of intestine, part unspecified, without perforation or abscess without bleeding 08/15/2014    Diverticulosis    Flat feet, bilateral 09/18/2023    Personal history of other diseases of the digestive system 07/23/2014    History of diverticulitis of colon    Personal history of other  diseases of the musculoskeletal system and connective tissue     Personal history of scoliosis     Documented in chart and reviewed.     Past Surgical History:   Procedure Laterality Date    ANTERIOR CRUCIATE LIGAMENT REPAIR  2014    Primary Repair Of Knee Ligament Cruciate Anterior     SECTION, CLASSIC  2014     Section    OTHER SURGICAL HISTORY  2014    Knee Arthroscopy With Medial Meniscectomy    OTHER SURGICAL HISTORY  2019    Hernia repair       Allergies: She is allergic to spironolactone, ketorolac, lisinopril, morphine, bee venom protein (honey bee), dulaglutide, gabapentin, glucosamine, hepatitis b virus vaccine, psyllium husk, shellfish containing products, and tramadol.     Medications:  Current Outpatient Medications   Medication Instructions    benzalkonium chloride 0.13 % towelette USE AS DIRECTED    desonide (DesOwen) 0.05 % ointment APPLY SPARINGLY TO AFFECTED AREA(S) ON THE FACE DAILY FOR ITCHING AVOID USE IN THE EYES    EPINEPHrine (EPIPEN) 0.3 mg, intramuscular, Once as needed    ibuprofen 800 mg tablet TAKE ONE TABLET EVERY 8 HOURS AS NEEDED FOR PAIN    NIFEdipine CC 60 mg 24 hr tablet 1 tablet, oral, Daily (630)    semaglutide (OZEMPIC) 0.25 mg, subcutaneous, Weekly    tacrolimus (Protopic) 0.1 % ointment Use on light areas on skin, thin layer, in evening    tretinoin (Retin-A) 0.05 % cream Apply a small 1/2 pea sized amount to clean dry face at bedtime.  Start off 2x/week and increase as tolerated.    triamcinolone (Kenalog) 0.1 % cream APPLY 1 APPLICATION TO AFFECTED AREA TWICE DAILY.    Ventolin HFA 90 mcg/actuation inhaler 2 puffs, inhalation, Every 4 hours PRN    Vitamin D3 50 mcg (2,000 unit) tablet TAKE 2 TABLET DAILY       Family History   Problem Relation Name Age of Onset    Cataracts Mother Pankie     Diabetes Mother Pankie     Hypertension Mother Pankie     Scoliosis Mother Pankie     Crohn's disease Father Trae     Hypertension Father  Trae     Heart disease Father Trae     Breast cancer Sister  61    Graves' disease Sibling       Documented in chart and reviewed.     Social History     Tobacco Use    Smoking status: Former     Current packs/day: 0.00     Average packs/day: 1 pack/day for 13.1 years (13.1 ttl pk-yrs)     Types: Cigarettes     Start date: 2010     Quit date: 2024     Years since quittin.7    Smokeless tobacco: Never    Tobacco comments:     Quit 2023   Substance Use Topics    Alcohol use: Not Currently        Review of Systems: Review of systems completed with medical assistant intake. Please refer to this note.     Physical Exam:  BMI: 41.5    General: The patient is well appearing and has an appropriate affect.     Neurological Examination: SILT in SPN/DPN/Sural/Saphenous/Tibial nerves.  EHL, FHL, Tibial anterior, Gastrocnemius. Coordination grossly intact.     Cardiovascular Exam: Capillary refill <2 seconds.     Lymphatic Examination: Difficult to assess    Skin Exam: Skin around the pertinent joint is without evidence of infection or rash.    Gait: patient ambulates with antalgic gait.    Right Hip Examination:  Examination of the hip reveals the skin to be intact.     There is no tenderness over the greater trochanter.    Range of motion is full extension to 100 degrees of flexion.    The hip is stable without subluxation or dislocation.    The hip internally rotates to 15 degrees and externally rotates to 45 degrees.    There is no pain with hip motion.    Left Hip Examination:  Examination of the hip reveals the skin to be intact.     There is no tenderness over the greater trochanter.    Range of motion is full extension to 100 degrees of flexion.    The hip is stable without subluxation or dislocation.    The hip internally rotates to 15 degrees and externally rotates to 45 degrees.    There is no pain with hip motion.    Left Knee Examination:  Examination of the left knee reveals the skin to  be intact.    There is a moderate effusion in the knee.    The alignment of the knee is Varus.    This deformity is not correctable.    There is tenderness to palpation over the joint line.    There is significant quadriceps atrophy.    Range of Motion: 10 to 110 degrees of flexion.    The knee is stable to varus-valgus stress and anterior-posterior stress.     There is moderate grinding with range of motion.    There is moderate patellofemoral crepitus.    Right Knee Examination:  Examination of the left knee reveals the skin to be intact.    There is a moderate effusion in the knee.    The alignment of the knee is Varus.    This deformity is not correctable.    There is tenderness to palpation over the joint line.    There is significant quadriceps atrophy.    Range of Motion: 15 to 105 degrees of flexion.    The knee is stable to varus-valgus stress and anterior-posterior stress.     There is moderate grinding with range of motion.    There is moderate patellofemoral crepitus.    Imaging:  Radiographs were personally reviewed today. There is evidence of severe BILATERAL knee osteoarthritis with MEDIAL bone on bone apposition. There are retained staple implants in left distal femur.     Impression and Plan:  This patient is here for follow-up evaluation of BILATERAL knee.    DIAGNOSIS  Primary osteoarthritis of left knee    Primary osteoarthritis of right knee     I have discussed the options in detail with the patient. We have discussed anti-inflammatory medication, activity modification, physical therapy, corticosteroid injections, viscosupplementation injections, partial knee replacement surgery and total knee replacement surgery.  The patient is not yet optimized for surgery and is responding well to nonsurgical treatment measures.    The risks and benefits of all these treatment options have been discussed in detail.     The patient has tried at least 3 months of the above conservative treatments and  continues to have disabling pain, impaired activities of daily living and worsened quality of life.  Reviewed the surgical optimization steps to optimize their chances for a successful joint replacement surgery.      Currently their BMI is 42.  Discussed that obesity is a risk factor for continued progression of osteoarthritis. Each pound of weight loss offloads their hip and knee joints by 3-6 pounds.  The most effective of these options is weight loss mainly through restricting caloric intake. They would need to lose significant weight before being scheduled for surgery.  She is on Ozempic and is working with a nutritionist to lose weight.    Patient has extensively been involved in physical therapy .  Patient will continue their home exercise program. Strategies for pain management using over-the-counter anti-inflammatory medications reviewed.  The patient elects for a steroid injection, which was provided according to procedure note below. Encouraged them to maintain range of motion and strength around the knee joints.  They will continue to implement these strategies in addressing their pain.      Recommend the patient continue optimizing nonsurgical treatment interventions as outlined above for management of their knee arthritis.  I would be happy to see them again at any point to discuss surgery if they are more optimized or to review progress with nonsurgical treatment of arthritis. The patient verbalizes understanding with the recommendations and treatment plan as outlined above and is in agreement.  Questions were addressed.    RTC: As needed    X-rays at next visit: As needed    Large Joint Injection 19847: Knee  Consent given by: Patient  Timeout: Immediately prior to procedure the correct patient, procedure, and site was verified. Sterile gloves and semi-sterile technique were used.   Indications: Knee pain and joint swelling.   Location: BILATERAL knee  Needle size: 22 G  Approach:  Lateral    Medications administered: Please refer to medical assistant note for lot number and expiration date.   Subcutaneous   4 ml of 1% lidocaine     Deep   4 ml of 1% lidocaine   4 mL 0.5% marcaine   2 mL of 40 mg kenalog     Patient tolerance: Dressing applied. Patient tolerated the procedure well with no immediate complications.    L Inj/Asp: bilateral knee on 10/11/2024 9:29 AM  Indications: pain and joint swelling  Details: 22 G needle, lateral approach  Medications (Right): 80 mg triamcinolone acetonide 40 mg/mL; 8 mL lidocaine 10 mg/mL (1 %); 4 mL BUPivacaine HCl 0.5 % (5 mg/mL)  Medications (Left): 80 mg triamcinolone acetonide 40 mg/mL; 8 mL lidocaine 10 mg/mL (1 %); 4 mL BUPivacaine HCl 0.5 % (5 mg/mL)  Outcome: tolerated well, no immediate complications  Procedure, treatment alternatives, risks and benefits explained, specific risks discussed. Consent was given by the patient. Immediately prior to procedure a time out was called to verify the correct patient, procedure, equipment, support staff and site/side marked as required. Patient was prepped and draped in the usual sterile fashion.             _____________________  Neetu Rios MD   Attending Orthopaedic Surgeon  [facility]    [facility]    This office note was transcribed with dictation software.  Please excuse any typographical errors, program misunderstandings leading to inadvertent insertions or deletions of inappropriate wording, pronoun errors and other unintentional transcription errors not noticed on proof-reading.

## 2024-10-25 ENCOUNTER — APPOINTMENT (OUTPATIENT)
Dept: INTEGRATIVE MEDICINE | Facility: CLINIC | Age: 54
End: 2024-10-25
Payer: MEDICAID

## 2024-11-08 ENCOUNTER — APPOINTMENT (OUTPATIENT)
Dept: INTEGRATIVE MEDICINE | Facility: CLINIC | Age: 54
End: 2024-11-08
Payer: MEDICAID

## 2024-12-02 NOTE — PROGRESS NOTES
"Assessment/Plan   The patient's presentation is consistent with lumbar spondylosis, lumbosacral radiculitis, and mechanical joint dysfunction. Her condition is complicated by her idiopathic scoliosis. Based on the chronicity of her complaints I do feel that a trial of acupuncture alongside chiropractic care will help optimize the patient's outcome. Will use JEAN FERRERA.     TS radiograph 2017 - 35* L thoracic convexity, T5 to T12 (scoliosis)    MRI of lumbar spine from 11/20/2021: \"Mild to moderate rotatory levoconvex scoliosis and mild grade 1  anterolisthesis at L5-S1 associated with multilevel spondylosis most  notably minor disc bulging at L4-5 and moderate to advanced posterior   facet arthropathy at L4-5 and L5-S1 concordant with x-ray lumbar spine  series 09/22/2021.\"     Visits this year: 11    Subjective     HPI - Back pain - Previously saw my colleagues, Americo Cano & Sarah- Patient reports 7/10 NRS left-sided lower back and left gluteal pain. Denies radiation, numbness, tingling. Endorses frequent upper and middle back pain. She reports that she got significant benefit from chiropractic care/dry needling in the past. She stays active by swimming 3x per week. She also wants to start strength training.  Chronic issue for several years. Lost a lot of weight and no longer needs to use a walker since seeing chiropractor.     Review of Systems   Constitutional:  Negative for fever.   Eyes:  Negative for visual disturbance.   Respiratory:  Negative for shortness of breath.    Cardiovascular:  Negative for chest pain.   Gastrointestinal:  Negative for diarrhea, nausea and vomiting.   Genitourinary:  Negative for difficulty urinating and dysuria.   Skin:  Negative for color change.   Neurological:  Negative for dizziness.   Psychiatric/Behavioral:  Negative for agitation.    All other systems reviewed and are negative.    Objective   Examination findings (e.g., palpation & ROM): Dec LS ROM  HTN/tender LS erectors, " L g med  SLR BL 70    Segmental joint dysfunction was identified in the following areas using motion palpation and/or pain provocation assessment:  Cervical:   Thoracic: 5-8  Lumbopelvic: 1-2, SIJ BL    Physical Exam  Neurological:      Mental Status: She is alert.      Sensory: Sensation is intact.      Motor: Motor function is intact.      Coordination: Coordination is intact.      Gait: Gait is intact.      Deep Tendon Reflexes:      Reflex Scores:       Patellar reflexes are 1+ on the right side and 0 on the left side.       Achilles reflexes are 1+ on the right side and 0 on the left side.     Comments: 12/3/24       Plan   Today's treatment:  SMT to regions of segmental dysfunction identified on exam, using age-appropriate force, and manual diversified technique.   STM to patient tolerance to hypertonic paraspinal muscles  Dry needling (10 in, 10 out) to region of the chief complaint / hypertonic muscles identified upon palpation LS erectors, L g med  Manual dynamic stretching of the gluteal muscles, hamstrings, upper trapezius  Patient noted improved mobility and reduced pain post-treatment    Treatment Plan:   The patient and I discussed the risks and benefits of chiropractic care. Based on the patient's subjective complaints along with the examination findings, it is advised that a course of chiropractic treatment be initiated. The patient provided consent for care. The patient tolerated today's treatment with little or no additional discomfort and was instructed to contact the office for questions or concerns. Will see patient once per week then every 2 weeks when symptoms become mild/manageable, further spaced apart contingent upon improvement.     This chart note was generated using dictation software, and as such, there may be typographical errors present. Abbreviations: Cervical spine (CS), cervical-thoracic (CT), Dry needling (DN), Flexion adduction internal rotation (FAIR), high velocity, low  amplitude (HVLA), Lumbar spine (LS), Soft tissue manipulation (STM), spinal manipulative therapy (SMT), Straight leg raise (SLR), Thoracic spine (TS).

## 2024-12-03 ENCOUNTER — APPOINTMENT (OUTPATIENT)
Dept: INTEGRATIVE MEDICINE | Facility: CLINIC | Age: 54
End: 2024-12-03
Payer: MEDICAID

## 2024-12-03 DIAGNOSIS — M79.10 MYALGIA: ICD-10-CM

## 2024-12-03 DIAGNOSIS — M99.03 SOMATIC DYSFUNCTION OF LUMBAR REGION: ICD-10-CM

## 2024-12-03 DIAGNOSIS — M99.05 SOMATIC DYSFUNCTION OF PELVIS REGION: ICD-10-CM

## 2024-12-03 DIAGNOSIS — M99.02 SOMATIC DYSFUNCTION OF THORACIC REGION: Primary | ICD-10-CM

## 2024-12-03 DIAGNOSIS — M54.16 LUMBAR RADICULOPATHY: ICD-10-CM

## 2024-12-03 PROCEDURE — 98941 CHIROPRACT MANJ 3-4 REGIONS: CPT | Performed by: CHIROPRACTOR

## 2024-12-03 PROCEDURE — 99213 OFFICE O/P EST LOW 20 MIN: CPT | Performed by: CHIROPRACTOR

## 2024-12-03 ASSESSMENT — ENCOUNTER SYMPTOMS
AGITATION: 0
VOMITING: 0
DIFFICULTY URINATING: 0
DYSURIA: 0
DIZZINESS: 0
NAUSEA: 0
COLOR CHANGE: 0
FEVER: 0
SHORTNESS OF BREATH: 0
DIARRHEA: 0

## 2024-12-30 ASSESSMENT — ENCOUNTER SYMPTOMS
COLOR CHANGE: 0
NAUSEA: 0
AGITATION: 0
DYSURIA: 0
VOMITING: 0
FEVER: 0
SHORTNESS OF BREATH: 0
DIARRHEA: 0
DIZZINESS: 0
DIFFICULTY URINATING: 0

## 2024-12-30 NOTE — PROGRESS NOTES
"Assessment/Plan   The patient's presentation is consistent with lumbar spondylosis, lumbosacral radiculitis, and mechanical joint dysfunction. Her condition is complicated by her idiopathic scoliosis. Based on the chronicity of her complaints I do feel that a trial of acupuncture alongside chiropractic care will help optimize the patient's outcome. Will use JEAN FERRERA.     TS radiograph 2017 - 35* L thoracic convexity, T5 to T12 (scoliosis)    MRI of lumbar spine from 11/20/2021: \"Mild to moderate rotatory levoconvex scoliosis and mild grade 1  anterolisthesis at L5-S1 associated with multilevel spondylosis most  notably minor disc bulging at L4-5 and moderate to advanced posterior   facet arthropathy at L4-5 and L5-S1 concordant with x-ray lumbar spine  series 09/22/2021.\"     Visits this year: 12    Subjective   Patient reports she felt better after last visit with reduction in lower back pain and gluteal pain.  Notes gradual return of symptoms since that time currently endorsing frequent moderate to severe localized pain and tightness at the left lumbosacral junction upper gluteal region.  Has been swimming regularly and plans to start going to the gym using machines to exercise with resistance training starting off slow with lighter weight    HPI - Back pain - Previously saw my colleagues, Americo Cano & Sarah- Patient reports 7/10 NRS left-sided lower back and left gluteal pain. Denies radiation, numbness, tingling. Endorses frequent upper and middle back pain. She reports that she got significant benefit from chiropractic care/dry needling in the past. She stays active by swimming 3x per week. She also wants to start strength training.  Chronic issue for several years. Lost a lot of weight and no longer needs to use a walker since seeing chiropractor.     Review of Systems   Constitutional:  Negative for fever.   Eyes:  Negative for visual disturbance.   Respiratory:  Negative for shortness of breath.  "   Cardiovascular:  Negative for chest pain.   Gastrointestinal:  Negative for diarrhea, nausea and vomiting.   Genitourinary:  Negative for difficulty urinating and dysuria.   Skin:  Negative for color change.   Neurological:  Negative for dizziness.   Psychiatric/Behavioral:  Negative for agitation.    All other systems reviewed and are negative.    Objective   Examination findings (e.g., palpation & ROM): Dec LS ROM  HTN/tender LS erectors, L g med  SLR BL 70    Segmental joint dysfunction was identified in the following areas using motion palpation and/or pain provocation assessment:  Cervical:   Thoracic: 5-9  Lumbopelvic: 1-2, SIJ BL    Physical Exam  Neurological:      Mental Status: She is alert.      Sensory: Sensation is intact.      Motor: Motor function is intact.      Coordination: Coordination is intact.      Gait: Gait is intact.      Deep Tendon Reflexes:      Reflex Scores:       Patellar reflexes are 1+ on the right side and 0 on the left side.       Achilles reflexes are 1+ on the right side and 0 on the left side.     Comments: 12/3/24       Plan   Today's treatment:  SMT to regions of segmental dysfunction identified on exam, using age-appropriate force, and manual diversified technique.   STM to patient tolerance to hypertonic paraspinal muscles  Dry needling (10 in, 10 out) to region of the chief complaint / hypertonic muscles identified upon palpation LS erectors, L g med  Manual dynamic stretching of the gluteal muscles, hamstrings, upper trapezius  Patient noted improved mobility and reduced pain post-treatment    Treatment Plan:   The patient and I discussed the risks and benefits of chiropractic care. Based on the patient's subjective complaints along with the examination findings, it is advised that a course of chiropractic treatment be initiated. The patient provided consent for care. The patient tolerated today's treatment with little or no additional discomfort and was instructed to  contact the office for questions or concerns. Will see patient once per week then every 2 weeks when symptoms become mild/manageable, further spaced apart contingent upon improvement.     This chart note was generated using dictation software, and as such, there may be typographical errors present. Abbreviations: Cervical spine (CS), cervical-thoracic (CT), Dry needling (DN), Flexion adduction internal rotation (FAIR), high velocity, low amplitude (HVLA), Lumbar spine (LS), Soft tissue manipulation (STM), spinal manipulative therapy (SMT), Straight leg raise (SLR), Thoracic spine (TS).

## 2024-12-31 ENCOUNTER — APPOINTMENT (OUTPATIENT)
Dept: INTEGRATIVE MEDICINE | Facility: CLINIC | Age: 54
End: 2024-12-31
Payer: MEDICAID

## 2024-12-31 DIAGNOSIS — M99.03 SOMATIC DYSFUNCTION OF LUMBAR REGION: ICD-10-CM

## 2024-12-31 DIAGNOSIS — M79.10 MYALGIA: ICD-10-CM

## 2024-12-31 DIAGNOSIS — M99.04 SACROILIAC JOINT SOMATIC DYSFUNCTION: ICD-10-CM

## 2024-12-31 DIAGNOSIS — M99.02 SOMATIC DYSFUNCTION OF THORACIC REGION: Primary | ICD-10-CM

## 2024-12-31 DIAGNOSIS — M47.816 LUMBAR SPONDYLOSIS: ICD-10-CM

## 2024-12-31 DIAGNOSIS — M99.03 SEGMENTAL AND SOMATIC DYSFUNCTION OF LUMBAR REGION: ICD-10-CM

## 2024-12-31 DIAGNOSIS — M54.16 LUMBAR RADICULOPATHY: ICD-10-CM

## 2024-12-31 DIAGNOSIS — M99.05 SOMATIC DYSFUNCTION OF PELVIS REGION: ICD-10-CM

## 2024-12-31 PROCEDURE — 98941 CHIROPRACT MANJ 3-4 REGIONS: CPT | Performed by: CHIROPRACTOR

## 2025-01-13 ASSESSMENT — ENCOUNTER SYMPTOMS
COLOR CHANGE: 0
DIARRHEA: 0
AGITATION: 0
DIFFICULTY URINATING: 0
VOMITING: 0
SHORTNESS OF BREATH: 0
FEVER: 0
DIZZINESS: 0
DYSURIA: 0
NAUSEA: 0

## 2025-01-13 NOTE — PROGRESS NOTES
"Assessment/Plan   The patient's presentation is consistent with lumbar spondylosis, lumbosacral radiculitis, and mechanical joint dysfunction. Her condition is complicated by her idiopathic scoliosis. Based on the chronicity of her complaints I do feel that a trial of acupuncture alongside chiropractic care will help optimize the patient's outcome. Will use JEAN FERRERA.     TS radiograph 2017 - 35* L thoracic convexity, T5 to T12 (scoliosis)    MRI of lumbar spine from 11/20/2021: \"Mild to moderate rotatory levoconvex scoliosis and mild grade 1  anterolisthesis at L5-S1 associated with multilevel spondylosis most  notably minor disc bulging at L4-5 and moderate to advanced posterior   facet arthropathy at L4-5 and L5-S1 concordant with x-ray lumbar spine  series 09/22/2021.\"     Visits this year: 1    Subjective   Patient reports overall improvement noting intermittent mild to moderate pain and tightness in the left gluteal region With occasional severe pain.  Has been doing physical therapy which is gone well.  Also continues to swim and lift weights    HPI - Back pain - Previously saw my colleagues, Americo Cano & Sarah- Patient reports 7/10 NRS left-sided lower back and left gluteal pain. Denies radiation, numbness, tingling. Endorses frequent upper and middle back pain. She reports that she got significant benefit from chiropractic care/dry needling in the past. She stays active by swimming 3x per week. She also wants to start strength training.  Chronic issue for several years. Lost a lot of weight and no longer needs to use a walker since seeing chiropractor.     Review of Systems   Constitutional:  Negative for fever.   Eyes:  Negative for visual disturbance.   Respiratory:  Negative for shortness of breath.    Cardiovascular:  Negative for chest pain.   Gastrointestinal:  Negative for diarrhea, nausea and vomiting.   Genitourinary:  Negative for difficulty urinating and dysuria.   Skin:  Negative for color " change.   Neurological:  Negative for dizziness.   Psychiatric/Behavioral:  Negative for agitation.    All other systems reviewed and are negative.    Objective   Examination findings (e.g., palpation & ROM): Dec LS ROM  HTN/tender LS erectors, L g med  SLR BL 70    Segmental joint dysfunction was identified in the following areas using motion palpation and/or pain provocation assessment:  Cervical:   Thoracic: 5-7  Lumbopelvic: 1-2, SIJ BL    Physical Exam  Neurological:      Mental Status: She is alert.      Sensory: Sensation is intact.      Motor: Motor function is intact.      Coordination: Coordination is intact.      Gait: Gait is intact.      Deep Tendon Reflexes:      Reflex Scores:       Patellar reflexes are 1+ on the right side and 0 on the left side.       Achilles reflexes are 1+ on the right side and 0 on the left side.     Comments: 12/3/24       Plan   Today's treatment:  SMT to regions of segmental dysfunction identified on exam, using age-appropriate force, and manual diversified technique.   STM to patient tolerance to hypertonic paraspinal muscles  Dry needling (10 in, 10 out) to region of the chief complaint / hypertonic muscles identified upon palpation LS erectors, L g med  Manual dynamic stretching of the gluteal muscles, hamstrings, upper trapezius  Patient noted improved mobility and reduced pain post-treatment    Treatment Plan:   The patient and I discussed the risks and benefits of chiropractic care. Based on the patient's subjective complaints along with the examination findings, it is advised that a course of chiropractic treatment be initiated. The patient provided consent for care. The patient tolerated today's treatment with little or no additional discomfort and was instructed to contact the office for questions or concerns. Will see patient once per week then every 2 weeks when symptoms become mild/manageable, further spaced apart contingent upon improvement.     This chart note  was generated using dictation software, and as such, there may be typographical errors present. Abbreviations: Cervical spine (CS), cervical-thoracic (CT), Dry needling (DN), Flexion adduction internal rotation (FAIR), high velocity, low amplitude (HVLA), Lumbar spine (LS), Soft tissue manipulation (STM), spinal manipulative therapy (SMT), Straight leg raise (SLR), Thoracic spine (TS).

## 2025-01-14 ENCOUNTER — APPOINTMENT (OUTPATIENT)
Dept: INTEGRATIVE MEDICINE | Facility: CLINIC | Age: 55
End: 2025-01-14
Payer: MEDICAID

## 2025-01-14 DIAGNOSIS — M99.02 SOMATIC DYSFUNCTION OF THORACIC REGION: Primary | ICD-10-CM

## 2025-01-14 DIAGNOSIS — M99.03 SEGMENTAL AND SOMATIC DYSFUNCTION OF LUMBAR REGION: ICD-10-CM

## 2025-01-14 DIAGNOSIS — M47.816 LUMBAR SPONDYLOSIS: ICD-10-CM

## 2025-01-14 DIAGNOSIS — M99.05 SOMATIC DYSFUNCTION OF PELVIS REGION: ICD-10-CM

## 2025-01-14 DIAGNOSIS — M79.10 MYALGIA: ICD-10-CM

## 2025-01-14 DIAGNOSIS — M54.16 LUMBAR RADICULOPATHY: ICD-10-CM

## 2025-01-14 DIAGNOSIS — M99.04 SACROILIAC JOINT SOMATIC DYSFUNCTION: ICD-10-CM

## 2025-01-14 PROCEDURE — 98941 CHIROPRACT MANJ 3-4 REGIONS: CPT | Performed by: CHIROPRACTOR

## 2025-01-17 NOTE — PROGRESS NOTES
Neetu Rios MD   Adult Reconstruction and Joint Replacement Surgery  Phone: [phone]     Fax: [phone]       Name: Nupur Ramires  Age: 54 y.o.   : 1970   Date of Visit: 2025    Follow-up Knee    CC: Follow-up {LEFT RIGHT BILATERAL:09669} knee     History of Present Illness:  This patient presents with {Numbers; 1-10:41614} {weeks, months, years:86379} of {LEFT RIGHT BILATERAL:46677} knee pain.     They were last seen for this problem on 10/11/2024. At the last visit, the patient underwent bilateral knee injections. The patient reports ***.  She returns today for repeat injections    Patient has tried the following {treatments tried:56803}. Date of last steroid injection: ***. Patient {DOES/DOES NOT:02715} have pain at night. The patient {DOES/DOES NOT:69264} report falls related to this problem. Patient is able to walk {Blocks: 04739}. Patient is currently using {assistive device:21692} as assistive device. Primarily complains of {pain location:13547} pain. Patient has difficulty with {activity limitations:27677}. The pain is significantly impacting their ability to perform activities of daily living. Patient reports no longer able to do activities such as ***.     Focused History  PMH: {FOCUSEDHX:74827}  PSH: {FOCUSEDSHX:86729}  SHx: {SOCHX:38792}  Jehovah´s Witness: no***  Meds: {MEDSHX:07269}  Allergies: {MEDSHX:70082}  Dental Hx: {DENTAL:33114}  FH: ***No family history of any bleeding or clotting disorders.    HISTORY  ***    Review of Systems: Review of systems completed with medical assistant intake. Please refer to this note.     Physical Exam:  BMI: ***.    General: The patient is well appearing and has an appropriate affect.     Neurological Examination: ***SILT in SPN/DPN/Sural/Saphenous/Tibial nerves. ***5/5 EHL, FHL, Tibial anterior, Gastrocnemius. ***Coordination grossly intact.     Cardiovascular Exam: ***Capillary refill <2 seconds. ***2+ DP. ***No edema. ***No  "varicose veins.     Lymphatic Examination: There is no obvious lymphatic swelling*** present around the involved joint.    Skin Exam: Skin around the pertinent joint is without evidence of infection or ***rash.    Gait: patient ambulates with ***antalgic gait.    Right Hip Examination:  Examination of the hip reveals the skin to be ***intact.     There is no*** tenderness over the greater trochanter.    Range of motion is full extension to 100*** degrees of flexion.    The hip is stable without subluxation or dislocation.    The hip internally rotates to 15*** degrees and externally rotates to 45*** degrees.    There is no*** pain with hip motion.    Left Hip Examination:  Examination of the hip reveals the skin to be ***intact.     There is no*** tenderness over the greater trochanter.    Range of motion is full extension to 100*** degrees of flexion.    The hip is stable without subluxation or dislocation.    The hip internally rotates to 15*** degrees and externally rotates to 45*** degrees.    There is no*** pain with hip motion.    Left Knee Examination:  Examination of the knee reveals the skin to be intact.    There is {:57797::\"no\",\"a small\",\"a large\",\"a moderate\"} effusion in the knee.    The alignment of the knee is {:31179::\"Valgus\",\"neutral\",\"Varus\"}.    This deformity {:56493::\"is not\",\"is\"} correctable.    There is ***tenderness to palpation over the joint line.    There is ***significant quadriceps atrophy.    Range of Motion: {rom3:22098:: \"20\",\"15\",\"10\",\"5\",\"full extension\"} to {rom4:78998:: \"less than 90\",\"90\",\"100\",\"110\",\"120\"} degrees of flexion.    The knee is stable to varus-valgus stress and anterior-posterior stress.     There is {no/mild/moderate/severe:19664} grinding with range of motion.    There is {no/mild/moderate/severe:19664} patellofemoral crepitus.    Right Knee Examination:  Examination of the knee reveals the skin to be intact.    There is {:89178::\"no\",\"a small\",\"a large\",\"a " "moderate\"} effusion in the knee.    The alignment of the knee is {:29023::\"Valgus\",\"neutral\",\"Varus\"}.    This deformity {:25747::\"is not\",\"is\"} correctable.    There is ***tenderness to palpation over the joint line.    There is ***significant quadriceps atrophy.    Range of Motion: {rom3:28453:: \"20\",\"15\",\"10\",\"5\",\"full extension\"} to {rom4:90681:: \"less than 90\",\"90\",\"100\",\"110\",\"120\"} degrees of flexion.    The knee is stable to varus-valgus stress and anterior-posterior stress.     There is {no/mild/moderate/severe:19664} grinding with range of motion.    There is {no/mild/moderate/severe:19664} patellofemoral crepitus.    Imaging:  ***X-rays were personally reviewed today and show implants in good position with no evidence of complication.    ***Radiographs were personally reviewed today. There is evidence of {mild, moderate, severe: 83995} {LEFT RIGHT BILATERAL: 24171} knee osteoarthritis {with or without:71359} {medial, lateral, patellofemoral, tri:14770} bone on bone apposition.    Impression and Plan:  This patient is here for follow-up evaluation of {LEFT RIGHT BILATERAL:63247} knee.    DIAGNOSIS  ***    ***I have discussed the options in detail with the patient. We have discussed anti-inflammatory medication, activity modification, physical therapy, corticosteroid injections, viscosupplementation injections, ***partial knee replacement surgery and total knee replacement surgery. ***Patient is responding well to nonsurgical treatment measures.    ***The risks and benefits of all these treatment options have been discussed in detail.     ***The patient has tried at least 3 months of the above conservative treatments and continues to have disabling pain, impaired activities of daily living and worsened quality of life.  Reviewed the surgical optimization steps to optimize their chances for a successful joint replacement surgery.      ***Currently their BMI is ***.  Discussed that obesity is a risk factor " for continued progression of osteoarthritis. Each pound of weight loss offloads their hip and knee joints by 3-6 pounds.  The most effective of these options is weight loss mainly through restricting caloric intake. ***They would need to lose significant weight before being scheduled for surgery. A referral to a nutritionist was offered***. A referral to bariatric surgery was offered***.     ***A physical therapy prescription was ordered for the patient.  ***Patient will continue their home exercise program. ***Strategies for pain management using over-the-counter anti-inflammatory medications reviewed.  ***The patient was prescribed ***for pain management. ***The patient elects for a steroid injection, which was provided according to procedure note below. Encouraged them to maintain range of motion and strength around the knee joints.  They will continue to implement these strategies in addressing their pain.      ***Patient was advised to seek further refills for prescription anti-inflammatory medications (e.g. Celebrex, Meloxicam) from their primary care physician as careful monitoring of kidney function, heart function, blood pressure, and other health considerations is important while on these medications.      ***Recommend the patient continue optimizing nonsurgical treatment interventions as outlined above for management of their knee arthritis.  ***I would be happy to see them again at any point to discuss surgery if indicated or they are more optimized or to review progress with nonsurgical treatment of arthritis. The patient verbalizes understanding with the recommendations and treatment plan as outlined above and is in agreement.  Questions were addressed.    RTC: {RTC:42792}    X-rays at next visit: {X-rays:67984}    ***Large Joint Injection 20610: Knee  Consent given by: Patient  Timeout: Immediately prior to procedure the correct patient, procedure, and site was verified. Sterile gloves and  semi-sterile technique were used.   Indications: Knee pain and joint swelling.   Location: {LEFT RIGHT BILATERAL:69847} knee  Needle size: 22 G  Approach: Lateral    Medications administered: Please refer to medical assistant note for lot number and expiration date.   Subcutaneous   4 ml of 1% lidocaine     Deep   4 ml of 1% lidocaine   4 mL 0.5% marcaine   2 mL of 40 mg kenalog     Aspirate amount: ***  Aspirate Appearance: ***   ***Analysis: Fluid sent for laboratory analysis, including cell count, differential, culture, crystal, AFB    Patient tolerance: Dressing applied. Patient tolerated the procedure well with no immediate complications.      _____________________  Neetu Rios MD   Attending Orthopaedic Surgeon  [facility]    [facility]    This office note was transcribed with dictation software.  Please excuse any typographical errors, program misunderstandings leading to inadvertent insertions or deletions of inappropriate wording, pronoun errors and other unintentional transcription errors not noticed on proof-reading.

## 2025-01-22 ENCOUNTER — APPOINTMENT (OUTPATIENT)
Dept: ORTHOPEDIC SURGERY | Facility: HOSPITAL | Age: 55
End: 2025-01-22
Payer: MEDICAID

## 2025-01-22 DIAGNOSIS — M17.11 UNILATERAL PRIMARY OSTEOARTHRITIS, RIGHT KNEE: Primary | ICD-10-CM

## 2025-01-22 DIAGNOSIS — M17.12 UNILATERAL PRIMARY OSTEOARTHRITIS, LEFT KNEE: ICD-10-CM

## 2025-01-26 NOTE — PROGRESS NOTES
Neetu Rios MD   Adult Reconstruction and Joint Replacement Surgery  Phone: [phone]     Fax: [phone]       Name: Nupur Ramires  Age: 54 y.o.   : 1970   Date of Visit: 2025    Follow-up Knee    CC: Follow-up BILATERAL knee     History of Present Illness:  This patient presents with several years of BILATERAL knee pain.     They were last seen for this problem on 10/11/24. At the last visit, the patient underwent bilateral steroid injections. The patient reports approximately 16 weeks of 100% relief from these injections.  However she reports the injections are not lasting as long as they previously have.  She has started physical therapy which is really helping.  She wonders about the possibility of hyaluronic acid type injections.    Patient has tried the following physical therapy, weight loss, icing, over-the-counter medications, weight training, corticosteroid injections . Date of last steroid injection: 10/11/2024  -16 weeks of relief. Reports that she gets injections every 3 months for the past 2 years and they have been working well. Patient does have pain at night that makes it difficult to sleep. Patient is able to walk 2-3 blocks. Patient is currently using walker as assistive device. Primarily complains of posterior pain. Patient has difficulty with climbing stairs, walking , prolonged sitting , and walking on unlevel surfaces . The pain is significantly impacting her ability to perform activities of daily living. Patient reports no longer able to do activities such as swimming, walking long distances. 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 Denies any feelings of instability but reports that she feels clicking in her knee. She reports that she used to weigh over 400 pounds but has able to lose a significant amount of weight through physical activity and ozempic. Reports that she has had 2 recent hospitalizations for hypertensive  emergency.      Focused History  PMH: Reviewed and PE/DVT: None  PSH: Reviewed , Hip/Knee replacement: None, Hip/Knee surgery: L ACL reconstruction, R meniscus repair, Anesthesia complications: None, Spine surgery: None, Surgical infection: None, and Weight loss surgery: None  Meds: Reviewed, Current Anticoagulants: None, Weight loss medication: Ozempic, and Current Opioids: None  Allergies: Reviewed  and The patient reports no contraindications or allergies to cephalosporins, aspirin, NSAIDs or opioids, except as noted above.  FH: No family history of any bleeding or clotting disorders.  SHx: Reviewed, Occupation: [employer] teacher, Current smoker: No, quit 3 months prior, EtOH intake weekly: None, and Preferred physical activities: Swimming  Jehovah's witness: no    HISTORY  PROMs   No questionnaires on file.     Past Medical History:   Diagnosis Date    Achilles tendinitis of left lower extremity 09/18/2023    Acute medial meniscus tear 09/18/2023    Allergic reaction 09/18/2023    Angio-edema, sequela 09/18/2023    Arthritis April 3 2013    Closed displaced fracture of fifth metatarsal bone of left foot 09/18/2023    Diabetes mellitus (Multi)     Diverticulosis of intestine, part unspecified, without perforation or abscess without bleeding 08/15/2014    Diverticulosis    Flat feet, bilateral 09/18/2023    Personal history of other diseases of the digestive system 07/23/2014    History of diverticulitis of colon    Personal history of other diseases of the musculoskeletal system and connective tissue     Personal history of scoliosis       Past Medical History:   Diagnosis Date    Achilles tendinitis of left lower extremity 09/18/2023    Acute medial meniscus tear 09/18/2023    Allergic reaction 09/18/2023    Angio-edema, sequela 09/18/2023    Arthritis April 3 2013    Closed displaced fracture of fifth metatarsal bone of left foot 09/18/2023    Diabetes mellitus (Multi)     Diverticulosis of intestine, part  unspecified, without perforation or abscess without bleeding 08/15/2014    Diverticulosis    Flat feet, bilateral 2023    Personal history of other diseases of the digestive system 2014    History of diverticulitis of colon    Personal history of other diseases of the musculoskeletal system and connective tissue     Personal history of scoliosis     Documented in chart and reviewed.     Past Surgical History:   Procedure Laterality Date    ANTERIOR CRUCIATE LIGAMENT REPAIR  2014    Primary Repair Of Knee Ligament Cruciate Anterior     SECTION, CLASSIC  2014     Section    OTHER SURGICAL HISTORY  2014    Knee Arthroscopy With Medial Meniscectomy    OTHER SURGICAL HISTORY  2019    Hernia repair       Allergies: She is allergic to spironolactone, ketorolac, lisinopril, morphine, bee venom protein (honey bee), dulaglutide, gabapentin, glucosamine, hepatitis b virus vaccine, psyllium husk, shellfish containing products, and tramadol.     Medications:  Current Outpatient Medications   Medication Instructions    benzalkonium chloride 0.13 % towelette USE AS DIRECTED    desonide (DesOwen) 0.05 % ointment APPLY SPARINGLY TO AFFECTED AREA(S) ON THE FACE DAILY FOR ITCHING AVOID USE IN THE EYES    EPINEPHrine (EPIPEN) 0.3 mg, intramuscular, Once as needed    ibuprofen 800 mg tablet TAKE ONE TABLET EVERY 8 HOURS AS NEEDED FOR PAIN    NIFEdipine CC 60 mg 24 hr tablet 1 tablet, Daily (630)    semaglutide (OZEMPIC) 0.25 mg, Weekly    tacrolimus (Protopic) 0.1 % ointment Use on light areas on skin, thin layer, in evening    tretinoin (Retin-A) 0.05 % cream Apply a small 1/2 pea sized amount to clean dry face at bedtime.  Start off 2x/week and increase as tolerated.    triamcinolone (Kenalog) 0.1 % cream APPLY 1 APPLICATION TO AFFECTED AREA TWICE DAILY.    Ventolin HFA 90 mcg/actuation inhaler 2 puffs, Every 4 hours PRN    Vitamin D3 50 mcg (2,000 unit) tablet TAKE 2 TABLET DAILY        Family History   Problem Relation Name Age of Onset    Cataracts Mother Pankie     Diabetes Mother Pankie     Hypertension Mother Pankie     Scoliosis Mother Pankie     Crohn's disease Father Trae     Hypertension Father Trae     Heart disease Father Trae     Breast cancer Sister  61    Graves' disease Sibling       Documented in chart and reviewed.     Social History     Tobacco Use    Smoking status: Former     Current packs/day: 0.00     Average packs/day: 1 pack/day for 13.1 years (13.1 ttl pk-yrs)     Types: Cigarettes     Start date: 2010     Quit date: 2024     Years since quittin.0    Smokeless tobacco: Never    Tobacco comments:     Quit 2023   Substance Use Topics    Alcohol use: Not Currently        Review of Systems: Review of systems completed with medical assistant intake. Please refer to this note.     Physical Exam:  BMI: Greater than 40.    General: The patient is well appearing and has an appropriate affect.      Neurological Examination: SILT in SPN/DPN/Sural/Saphenous/Tibial nerves.  EHL, FHL, Tibial anterior, Gastrocnemius. Coordination grossly intact.      Cardiovascular Exam: Capillary refill <2 seconds.      Lymphatic Examination: Difficult to assess     Skin Exam: Skin around the pertinent joint is without evidence of infection or rash.     Gait: patient ambulates with antalgic gait.     Right Hip Examination:  Examination of the hip reveals the skin to be intact.      There is no tenderness over the greater trochanter.     Range of motion is full extension to 100 degrees of flexion.     The hip is stable without subluxation or dislocation.     The hip internally rotates to 15 degrees and externally rotates to 45 degrees.     There is no pain with hip motion.     Left Hip Examination:  Examination of the hip reveals the skin to be intact.      There is no tenderness over the greater trochanter.     Range of motion is full extension to 100 degrees of  flexion.     The hip is stable without subluxation or dislocation.     The hip internally rotates to 15 degrees and externally rotates to 45 degrees.     There is no pain with hip motion.     Left Knee Examination:  Examination of the left knee reveals the skin to be intact.     There is a moderate effusion in the knee.     The alignment of the knee is Varus.     This deformity is not correctable.     There is tenderness to palpation over the joint line.     There is significant quadriceps atrophy.     Range of Motion: 10 to 110 degrees of flexion.     The knee is stable to varus-valgus stress and anterior-posterior stress.      There is moderate grinding with range of motion.     There is moderate patellofemoral crepitus.     Right Knee Examination:  Examination of the left knee reveals the skin to be intact.     There is a moderate effusion in the knee.     The alignment of the knee is Varus.     This deformity is not correctable.     There is tenderness to palpation over the joint line.     There is significant quadriceps atrophy.     Range of Motion: 15 to 105 degrees of flexion.     The knee is stable to varus-valgus stress and anterior-posterior stress.      There is moderate grinding with range of motion.     There is moderate patellofemoral crepitus.    Imaging:  Radiographs were personally reviewed today. There is evidence of severe BILATERAL knee osteoarthritis with MEDIAL bone on bone apposition. There are retained staple implants in left distal femur.     Impression and Plan:  This patient is here for follow-up evaluation of BILATERAL knee.    DIAGNOSIS  Unilateral primary osteoarthritis, left knee    Unilateral primary osteoarthritis, right knee     I have discussed the options in detail with the patient. We have discussed anti-inflammatory medication, activity modification, physical therapy, corticosteroid injections, viscosupplementation injections, partial knee replacement surgery and total knee  replacement surgery. Patient is responding well to nonsurgical treatment measures.  She also continues to optimize for surgery by working on weight loss.    The risks and benefits of all these treatment options have been discussed in detail.     The patient has tried at least 3 months of the above conservative treatments and continues to have disabling pain, impaired activities of daily living and worsened quality of life.  Reviewed the surgical optimization steps to optimize their chances for a successful joint replacement surgery.      Currently their BMI is >40.  Discussed that obesity is a risk factor for continued progression of osteoarthritis. Each pound of weight loss offloads their hip and knee joints by 3-6 pounds.  The most effective of these options is weight loss mainly through restricting caloric intake. They would need to lose significant weight before being scheduled for surgery.    Patient will continue their home exercise program. Strategies for pain management using over-the-counter anti-inflammatory medications reviewed.  The patient elects for a steroid injection, which was provided according to procedure note below. Encouraged them to maintain range of motion and strength around the knee joints.  They will continue to implement these strategies in addressing their pain.      Recommend the patient continue optimizing nonsurgical treatment interventions as outlined above for management of their knee arthritis.  I would be happy to see them again at any point to discuss surgery if indicated or they are more optimized or to review progress with nonsurgical treatment of arthritis. The patient verbalizes understanding with the recommendations and treatment plan as outlined above and is in agreement.  Questions were addressed.    RTC: as needed    X-rays at next visit: as needed    Large Joint Injection [id number]: Knee  Consent given by: Patient  Timeout: Immediately prior to procedure the correct patient,  procedure, and site was verified. Sterile gloves and semi-sterile technique were used.   Indications: Knee pain and joint swelling.   Location: BILATERAL knee  Needle size: 22 G  Approach: Lateral    Medications administered: Please refer to medical assistant note for lot number and expiration date.   Subcutaneous   4 ml of 1% lidocaine     Deep   4 ml of 1% lidocaine   4 mL 0.5% marcaine   2 mL of 40 mg kenalog     Patient tolerance: Dressing applied. Patient tolerated the procedure well with no immediate complications.    L Inj/Asp: bilateral knee on 1/29/2025 6:07 AM  Indications: pain and joint swelling  Details: 22 G needle, lateral approach  Medications (Right): 80 mg triamcinolone acetonide 40 mg/mL; 4 mL BUPivacaine HCl 0.5 % (5 mg/mL); 8 mL lidocaine 10 mg/mL (1 %)  Medications (Left): 80 mg triamcinolone acetonide 40 mg/mL; 8 mL lidocaine 10 mg/mL (1 %); 4 mL BUPivacaine HCl 0.5 % (5 mg/mL)  Outcome: tolerated well, no immediate complications  Procedure, treatment alternatives, risks and benefits explained, specific risks discussed. Consent was given by the patient. Immediately prior to procedure a time out was called to verify the correct patient, procedure, equipment, support staff and site/side marked as required. Patient was prepped and draped in the usual sterile fashion.           _____________________  Neetu Rios MD   Attending Orthopaedic Surgeon  [facility]    [facility]    This office note was transcribed with dictation software.  Please excuse any typographical errors, program misunderstandings leading to inadvertent insertions or deletions of inappropriate wording, pronoun errors and other unintentional transcription errors not noticed on proof-reading.

## 2025-01-27 ASSESSMENT — ENCOUNTER SYMPTOMS
DYSURIA: 0
VOMITING: 0
COLOR CHANGE: 0
DIZZINESS: 0
SHORTNESS OF BREATH: 0
DIFFICULTY URINATING: 0
AGITATION: 0
NAUSEA: 0
DIARRHEA: 0
FEVER: 0

## 2025-01-27 NOTE — PROGRESS NOTES
"Assessment/Plan   The patient's presentation is consistent with lumbar spondylosis, lumbosacral radiculitis, and mechanical joint dysfunction. Her condition is complicated by her idiopathic scoliosis. Based on the chronicity of her complaints I do feel that a trial of acupuncture alongside chiropractic care will help optimize the patient's outcome. Will use JEAN FERRERA.     TS radiograph 2017 - 35* L thoracic convexity, T5 to T12 (scoliosis)    MRI of lumbar spine from 11/20/2021: \"Mild to moderate rotatory levoconvex scoliosis and mild grade 1  anterolisthesis at L5-S1 associated with multilevel spondylosis most  notably minor disc bulging at L4-5 and moderate to advanced posterior   facet arthropathy at L4-5 and L5-S1 concordant with x-ray lumbar spine  series 09/22/2021.\"     Visits this year: 2    Subjective   Patient reports improvement since her last visit noting mild intermittent left-sided lower back pain and gluteal pain.  Endorses 7 out of 10 right-sided neck pain which is localized without radiation numbness or tingling.  Feels it may have been brought on by a more challenging workout than she normally does including weightlifting for the upper body including bicep curls with heavier weight than she normally does.  This was on Thursday and there was no major injury or trauma but she has had some lasting tightness and stiffness in the neck since then.  Also could have been brought on by looking at her phone but she is not sure.    HPI - Back pain - Previously saw my colleagues, Americo Cano & Sarah- Patient reports 7/10 NRS left-sided lower back and left gluteal pain. Denies radiation, numbness, tingling. Endorses frequent upper and middle back pain. She reports that she got significant benefit from chiropractic care/dry needling in the past. She stays active by swimming 3x per week. She also wants to start strength training.  Chronic issue for several years. Lost a lot of weight and no longer needs to use " a walker since seeing chiropractor.     Review of Systems   Constitutional:  Negative for fever.   Eyes:  Negative for visual disturbance.   Respiratory:  Negative for shortness of breath.    Cardiovascular:  Negative for chest pain.   Gastrointestinal:  Negative for diarrhea, nausea and vomiting.   Genitourinary:  Negative for difficulty urinating and dysuria.   Skin:  Negative for color change.   Neurological:  Negative for dizziness.   Psychiatric/Behavioral:  Negative for agitation.    All other systems reviewed and are negative.    Objective   Examination findings (e.g., palpation & ROM): Dec LS ROM and CS Rotation with increased pain  HTN/tender LS erectors, L g med, R CS erectors  SLR BL 70    Segmental joint dysfunction was identified in the following areas using motion palpation and/or pain provocation assessment:  Cervical: 2  Thoracic: 5-7  Lumbopelvic: 1-2, SIJ BL    Physical Exam  Neurological:      Mental Status: She is alert.      Sensory: Sensation is intact.      Motor: Motor function is intact.      Coordination: Coordination is intact.      Gait: Gait is intact.      Deep Tendon Reflexes:      Reflex Scores:       Patellar reflexes are 1+ on the right side and 0 on the left side.       Achilles reflexes are 1+ on the right side and 0 on the left side.     Comments: 12/3/24       Plan   Today's treatment:  SMT to regions of segmental dysfunction identified on exam, using age-appropriate force, and manual diversified technique.   STM to patient tolerance to hypertonic paraspinal muscles  Dry needling (10 in, 10 out) to region of the chief complaint / hypertonic muscles identified upon palpation LS erectors, L g med  Manual dynamic stretching of the gluteal muscles, hamstrings, upper trapezius  Patient noted improved mobility and reduced pain post-treatment    Treatment Plan:   The patient and I discussed the risks and benefits of chiropractic care. Based on the patient's subjective complaints along  with the examination findings, it is advised that a course of chiropractic treatment be initiated. The patient provided consent for care. The patient tolerated today's treatment with little or no additional discomfort and was instructed to contact the office for questions or concerns. Will see patient once per week then every 2 weeks when symptoms become mild/manageable, further spaced apart contingent upon improvement.     This chart note was generated using dictation software, and as such, there may be typographical errors present. Abbreviations: Cervical spine (CS), cervical-thoracic (CT), Dry needling (DN), Flexion adduction internal rotation (FAIR), high velocity, low amplitude (HVLA), Lumbar spine (LS), Soft tissue manipulation (STM), spinal manipulative therapy (SMT), Straight leg raise (SLR), Thoracic spine (TS).

## 2025-01-28 ENCOUNTER — APPOINTMENT (OUTPATIENT)
Dept: INTEGRATIVE MEDICINE | Facility: CLINIC | Age: 55
End: 2025-01-28
Payer: MEDICAID

## 2025-01-28 DIAGNOSIS — M99.01 CERVICAL SEGMENT DYSFUNCTION: ICD-10-CM

## 2025-01-28 DIAGNOSIS — M99.05 SOMATIC DYSFUNCTION OF PELVIS REGION: ICD-10-CM

## 2025-01-28 DIAGNOSIS — M47.816 LUMBAR SPONDYLOSIS: ICD-10-CM

## 2025-01-28 DIAGNOSIS — M54.16 LUMBAR RADICULOPATHY: ICD-10-CM

## 2025-01-28 DIAGNOSIS — M54.2 NECK PAIN: ICD-10-CM

## 2025-01-28 DIAGNOSIS — M99.03 SEGMENTAL AND SOMATIC DYSFUNCTION OF LUMBAR REGION: ICD-10-CM

## 2025-01-28 DIAGNOSIS — M99.02 SOMATIC DYSFUNCTION OF THORACIC REGION: Primary | ICD-10-CM

## 2025-01-28 DIAGNOSIS — M79.10 MYALGIA: ICD-10-CM

## 2025-01-28 PROCEDURE — 98941 CHIROPRACT MANJ 3-4 REGIONS: CPT | Performed by: CHIROPRACTOR

## 2025-01-29 ENCOUNTER — OFFICE VISIT (OUTPATIENT)
Dept: ORTHOPEDIC SURGERY | Facility: HOSPITAL | Age: 55
End: 2025-01-29
Payer: MEDICAID

## 2025-01-29 DIAGNOSIS — M17.11 UNILATERAL PRIMARY OSTEOARTHRITIS, RIGHT KNEE: ICD-10-CM

## 2025-01-29 DIAGNOSIS — M17.12 UNILATERAL PRIMARY OSTEOARTHRITIS, LEFT KNEE: Primary | ICD-10-CM

## 2025-01-29 PROCEDURE — 2500000004 HC RX 250 GENERAL PHARMACY W/ HCPCS (ALT 636 FOR OP/ED): Performed by: STUDENT IN AN ORGANIZED HEALTH CARE EDUCATION/TRAINING PROGRAM

## 2025-01-29 PROCEDURE — 99214 OFFICE O/P EST MOD 30 MIN: CPT | Mod: 50,25 | Performed by: STUDENT IN AN ORGANIZED HEALTH CARE EDUCATION/TRAINING PROGRAM

## 2025-01-29 PROCEDURE — 20610 DRAIN/INJ JOINT/BURSA W/O US: CPT | Mod: 50 | Performed by: STUDENT IN AN ORGANIZED HEALTH CARE EDUCATION/TRAINING PROGRAM

## 2025-01-29 PROCEDURE — 99214 OFFICE O/P EST MOD 30 MIN: CPT | Performed by: STUDENT IN AN ORGANIZED HEALTH CARE EDUCATION/TRAINING PROGRAM

## 2025-01-29 RX ADMIN — LIDOCAINE HYDROCHLORIDE 8 ML: 10 INJECTION, SOLUTION INFILTRATION; PERINEURAL at 06:07

## 2025-01-29 RX ADMIN — BUPIVACAINE HYDROCHLORIDE 4 ML: 5 INJECTION, SOLUTION PERINEURAL at 06:07

## 2025-01-29 RX ADMIN — TRIAMCINOLONE ACETONIDE 80 MG: 40 INJECTION, SUSPENSION INTRA-ARTICULAR; INTRAMUSCULAR at 06:07

## 2025-01-31 RX ORDER — TRIAMCINOLONE ACETONIDE 40 MG/ML
80 INJECTION, SUSPENSION INTRA-ARTICULAR; INTRAMUSCULAR
Status: COMPLETED | OUTPATIENT
Start: 2025-01-29 | End: 2025-01-29

## 2025-01-31 RX ORDER — LIDOCAINE HYDROCHLORIDE 10 MG/ML
8 INJECTION, SOLUTION INFILTRATION; PERINEURAL
Status: COMPLETED | OUTPATIENT
Start: 2025-01-29 | End: 2025-01-29

## 2025-01-31 RX ORDER — BUPIVACAINE HYDROCHLORIDE 5 MG/ML
4 INJECTION, SOLUTION PERINEURAL
Status: COMPLETED | OUTPATIENT
Start: 2025-01-29 | End: 2025-01-29

## 2025-02-06 ENCOUNTER — APPOINTMENT (OUTPATIENT)
Dept: INTEGRATIVE MEDICINE | Facility: CLINIC | Age: 55
End: 2025-02-06
Payer: MEDICAID

## 2025-02-11 ENCOUNTER — APPOINTMENT (OUTPATIENT)
Dept: INTEGRATIVE MEDICINE | Facility: CLINIC | Age: 55
End: 2025-02-11
Payer: MEDICAID

## 2025-02-12 ENCOUNTER — APPOINTMENT (OUTPATIENT)
Dept: OBSTETRICS AND GYNECOLOGY | Facility: CLINIC | Age: 55
End: 2025-02-12
Payer: MEDICAID

## 2025-02-18 ENCOUNTER — APPOINTMENT (OUTPATIENT)
Dept: INTEGRATIVE MEDICINE | Facility: CLINIC | Age: 55
End: 2025-02-18
Payer: MEDICAID

## 2025-03-10 ASSESSMENT — ENCOUNTER SYMPTOMS
SHORTNESS OF BREATH: 0
DIFFICULTY URINATING: 0
DIARRHEA: 0
AGITATION: 0
VOMITING: 0
FEVER: 0
DIZZINESS: 0
NAUSEA: 0
COLOR CHANGE: 0
DYSURIA: 0

## 2025-03-11 ENCOUNTER — APPOINTMENT (OUTPATIENT)
Dept: INTEGRATIVE MEDICINE | Facility: CLINIC | Age: 55
End: 2025-03-11
Payer: MEDICAID

## 2025-03-11 ENCOUNTER — ALLIED HEALTH (OUTPATIENT)
Dept: INTEGRATIVE MEDICINE | Facility: CLINIC | Age: 55
End: 2025-03-11
Payer: MEDICAID

## 2025-03-11 DIAGNOSIS — M99.03 SOMATIC DYSFUNCTION OF LUMBAR REGION: ICD-10-CM

## 2025-03-11 DIAGNOSIS — M79.10 MYALGIA: ICD-10-CM

## 2025-03-11 DIAGNOSIS — M54.17 LUMBOSACRAL RADICULITIS: ICD-10-CM

## 2025-03-11 DIAGNOSIS — M54.2 NECK PAIN: ICD-10-CM

## 2025-03-11 DIAGNOSIS — M47.816 LUMBAR SPONDYLOSIS: ICD-10-CM

## 2025-03-11 DIAGNOSIS — M99.01 CERVICAL SEGMENT DYSFUNCTION: ICD-10-CM

## 2025-03-11 DIAGNOSIS — M99.05 SOMATIC DYSFUNCTION OF PELVIS REGION: ICD-10-CM

## 2025-03-11 DIAGNOSIS — M99.02 SOMATIC DYSFUNCTION OF THORACIC REGION: Primary | ICD-10-CM

## 2025-03-11 PROCEDURE — 98941 CHIROPRACT MANJ 3-4 REGIONS: CPT | Performed by: CHIROPRACTOR

## 2025-03-11 NOTE — PROGRESS NOTES
"Assessment/Plan   The patient's presentation is consistent with lumbar spondylosis, lumbosacral radiculitis, and mechanical joint dysfunction. Her condition is complicated by her idiopathic scoliosis. Based on the chronicity of her complaints I do feel that a trial of acupuncture alongside chiropractic care will help optimize the patient's outcome. Will use JEAN FERRERA.     TS radiograph 2017 - 35* L thoracic convexity, T5 to T12 (scoliosis)    MRI of lumbar spine from 11/20/2021: \"Mild to moderate rotatory levoconvex scoliosis and mild grade 1  anterolisthesis at L5-S1 associated with multilevel spondylosis most  notably minor disc bulging at L4-5 and moderate to advanced posterior   facet arthropathy at L4-5 and L5-S1 concordant with x-ray lumbar spine  series 09/22/2021.\"     Visits this year: 3    Subjective   Patient reports aggravation of symptoms endorsing 8 out of 10 pain and tightness in the neck middle back and lower back locally without radiation although she does endorse left gluteal pain.  She missed a couple of appointments due to being snowed and at her residence could not make it here and then 2 weeks ago she slipped in the bathtub fell on her left anterior rib cage on the edge of the tub and the ribs have been gradually improving since noting that she has returned to her exercise routine and went swimming yesterday but initially they were quite sore and prevented her from exercise.  She did not seek medical care at the time did not have any imaging of the ribs.  But feels they are only just a little bit tender at the moment    HPI - Back pain - Previously saw my colleagues, Americo Cano & Sarah- Patient reports 7/10 NRS left-sided lower back and left gluteal pain. Denies radiation, numbness, tingling. Endorses frequent upper and middle back pain. She reports that she got significant benefit from chiropractic care/dry needling in the past. She stays active by swimming 3x per week. She also wants to " start strength training.  Chronic issue for several years. Lost a lot of weight and no longer needs to use a walker since seeing chiropractor.     Review of Systems   Constitutional:  Negative for fever.   Eyes:  Negative for visual disturbance.   Respiratory:  Negative for shortness of breath.    Cardiovascular:  Negative for chest pain.   Gastrointestinal:  Negative for diarrhea, nausea and vomiting.   Genitourinary:  Negative for difficulty urinating and dysuria.   Skin:  Negative for color change.   Neurological:  Negative for dizziness.   Psychiatric/Behavioral:  Negative for agitation.    All other systems reviewed and are negative.    Objective   Examination findings (e.g., palpation & ROM): Dec LS ROM and CS Rotation with increased pain  HTN/tender LS erectors, L g med, R CS erectors, L upper trapezius  SLR BL 70    Segmental joint dysfunction was identified in the following areas using motion palpation and/or pain provocation assessment:  Cervical: 2  Thoracic: 5-7  Lumbopelvic: 1-2, SIJ BL    Physical Exam  Neurological:      Mental Status: She is alert.      Sensory: Sensation is intact.      Motor: Motor function is intact.      Coordination: Coordination is intact.      Gait: Gait is intact.      Deep Tendon Reflexes:      Reflex Scores:       Patellar reflexes are 1+ on the right side and 0 on the left side.       Achilles reflexes are 1+ on the right side and 0 on the left side.     Comments: 12/3/24       Plan   Today's treatment:  SMT to regions of segmental dysfunction identified on exam, using age-appropriate force, and manual diversified technique.   STM to patient tolerance to hypertonic paraspinal muscles  Dry needling (10 in, 10 out) to region of the chief complaint / hypertonic muscles identified upon palpation LS erectors, L g med  Manual dynamic stretching of the gluteal muscles, hamstrings, upper trapezius  Patient noted improved mobility and reduced pain post-treatment    Treatment Plan:    The patient and I discussed the risks and benefits of chiropractic care. Based on the patient's subjective complaints along with the examination findings, it is advised that a course of chiropractic treatment be initiated. The patient provided consent for care. The patient tolerated today's treatment with little or no additional discomfort and was instructed to contact the office for questions or concerns. Will see patient once per week then every 2 weeks when symptoms become mild/manageable, further spaced apart contingent upon improvement.     This chart note was generated using dictation software, and as such, there may be typographical errors present. Abbreviations: Cervical spine (CS), cervical-thoracic (CT), Dry needling (DN), Flexion adduction internal rotation (FAIR), high velocity, low amplitude (HVLA), Lumbar spine (LS), Soft tissue manipulation (STM), spinal manipulative therapy (SMT), Straight leg raise (SLR), Thoracic spine (TS).

## 2025-03-21 ASSESSMENT — ENCOUNTER SYMPTOMS
DYSURIA: 0
VOMITING: 0
FEVER: 0
AGITATION: 0
DIFFICULTY URINATING: 0
SHORTNESS OF BREATH: 0
DIARRHEA: 0
DIZZINESS: 0
NAUSEA: 0
COLOR CHANGE: 0

## 2025-03-21 NOTE — PROGRESS NOTES
"Assessment/Plan   The patient's presentation is consistent with lumbar spondylosis, lumbosacral radiculitis, and mechanical joint dysfunction. Her condition is complicated by her idiopathic scoliosis. Based on the chronicity of her complaints I do feel that a trial of acupuncture alongside chiropractic care will help optimize the patient's outcome. Will use JEAN FERRERA.     TS radiograph 2017 - 35* L thoracic convexity, T5 to T12 (scoliosis)    MRI of lumbar spine from 11/20/2021: \"Mild to moderate rotatory levoconvex scoliosis and mild grade 1  anterolisthesis at L5-S1 associated with multilevel spondylosis most  notably minor disc bulging at L4-5 and moderate to advanced posterior   facet arthropathy at L4-5 and L5-S1 concordant with x-ray lumbar spine  series 09/22/2021.\"     Visits this year: 4    Subjective   Patient notes that she got relief from her last visit but had ongoing rib pain that is gradually improving since falling on the bathtub.  Is returning to her regular workout routine slowly and she is back in the pool doing some light workouts.  Pain in the neck and lower back are moderate intensity and frequent with a location of pain extending into the left gluteal region from the lower back.    HPI - Back pain - Previously saw my colleagues, Americo Cano & Sarah- Patient reports 7/10 NRS left-sided lower back and left gluteal pain. Denies radiation, numbness, tingling. Endorses frequent upper and middle back pain. She reports that she got significant benefit from chiropractic care/dry needling in the past. She stays active by swimming 3x per week. She also wants to start strength training.  Chronic issue for several years. Lost a lot of weight and no longer needs to use a walker since seeing chiropractor.     Review of Systems   Constitutional:  Negative for fever.   Eyes:  Negative for visual disturbance.   Respiratory:  Negative for shortness of breath.    Cardiovascular:  Negative for chest pain. "   Gastrointestinal:  Negative for diarrhea, nausea and vomiting.   Genitourinary:  Negative for difficulty urinating and dysuria.   Skin:  Negative for color change.   Neurological:  Negative for dizziness.   Psychiatric/Behavioral:  Negative for agitation.    All other systems reviewed and are negative.    Objective   Examination findings (e.g., palpation & ROM): Dec LS ROM and CS Rotation with increased pain  HTN/tender LS erectors, L g med, R CS erectors, L upper trapezius  SLR BL 70    Segmental joint dysfunction was identified in the following areas using motion palpation and/or pain provocation assessment:  Cervical: 2  Thoracic: 5-7  Lumbopelvic: 1-2, SIJ BL    Physical Exam  Neurological:      Mental Status: She is alert.      Sensory: Sensation is intact.      Motor: Motor function is intact.      Coordination: Coordination is intact.      Gait: Gait is intact.      Deep Tendon Reflexes:      Reflex Scores:       Patellar reflexes are 1+ on the right side and 0 on the left side.       Achilles reflexes are 1+ on the right side and 0 on the left side.     Comments: 12/3/24       Plan   Today's treatment:  SMT to regions of segmental dysfunction identified on exam, using age-appropriate force, and manual diversified technique.   STM to patient tolerance to hypertonic paraspinal muscles 1m  Dry needling (10 in, 10 out) to region of the chief complaint / hypertonic muscles identified upon palpation LS erectors, L g med 3m  Manual dynamic stretching of the gluteal muscles, hamstrings, upper trapezius 2m  Patient noted improved mobility and reduced pain post-treatment    Treatment Plan:   The patient and I discussed the risks and benefits of chiropractic care. Based on the patient's subjective complaints along with the examination findings, it is advised that a course of chiropractic treatment be initiated. The patient provided consent for care. The patient tolerated today's treatment with little or no additional  discomfort and was instructed to contact the office for questions or concerns. Will see patient once per week then every 2 weeks when symptoms become mild/manageable, further spaced apart contingent upon improvement.     This chart note was generated using dictation software, and as such, there may be typographical errors present. Abbreviations: Cervical spine (CS), cervical-thoracic (CT), Dry needling (DN), Flexion adduction internal rotation (FAIR), high velocity, low amplitude (HVLA), Lumbar spine (LS), Soft tissue manipulation (STM), spinal manipulative therapy (SMT), Straight leg raise (SLR), Thoracic spine (TS).

## 2025-03-25 ENCOUNTER — APPOINTMENT (OUTPATIENT)
Dept: INTEGRATIVE MEDICINE | Facility: CLINIC | Age: 55
End: 2025-03-25
Payer: MEDICAID

## 2025-03-25 DIAGNOSIS — M79.10 MYALGIA: ICD-10-CM

## 2025-03-25 DIAGNOSIS — M99.01 CERVICAL SEGMENT DYSFUNCTION: ICD-10-CM

## 2025-03-25 DIAGNOSIS — M99.05 SOMATIC DYSFUNCTION OF PELVIS REGION: ICD-10-CM

## 2025-03-25 DIAGNOSIS — M54.2 NECK PAIN: ICD-10-CM

## 2025-03-25 DIAGNOSIS — M99.03 SOMATIC DYSFUNCTION OF LUMBAR REGION: ICD-10-CM

## 2025-03-25 DIAGNOSIS — M47.816 LUMBAR SPONDYLOSIS: ICD-10-CM

## 2025-03-25 DIAGNOSIS — M99.02 SOMATIC DYSFUNCTION OF THORACIC REGION: Primary | ICD-10-CM

## 2025-03-25 DIAGNOSIS — M54.17 LUMBOSACRAL RADICULITIS: ICD-10-CM

## 2025-03-25 PROCEDURE — 98941 CHIROPRACT MANJ 3-4 REGIONS: CPT | Performed by: CHIROPRACTOR

## 2025-04-09 ASSESSMENT — ENCOUNTER SYMPTOMS
DIZZINESS: 0
COLOR CHANGE: 0
SHORTNESS OF BREATH: 0
VOMITING: 0
DIARRHEA: 0
NAUSEA: 0
FEVER: 0
DYSURIA: 0
AGITATION: 0
DIFFICULTY URINATING: 0
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
## 2025-04-09 NOTE — PROGRESS NOTES
"Assessment/Plan   The patient's presentation is consistent with lumbar spondylosis, lumbosacral radiculitis, and mechanical joint dysfunction. Her condition is complicated by her idiopathic scoliosis. Based on the chronicity of her complaints I do feel that a trial of acupuncture alongside chiropractic care will help optimize the patient's outcome. Will use JEAN FERRERA.     TS radiograph 2017 - 35* L thoracic convexity, T5 to T12 (scoliosis)    MRI of lumbar spine from 11/20/2021: \"Mild to moderate rotatory levoconvex scoliosis and mild grade 1  anterolisthesis at L5-S1 associated with multilevel spondylosis most  notably minor disc bulging at L4-5 and moderate to advanced posterior   facet arthropathy at L4-5 and L5-S1 concordant with x-ray lumbar spine  series 09/22/2021.\"     Visits this year: 5    Subjective   Patient reports improvement since last visit overall but she still dealing with pain and tightness in the neck and left side of the lower back and left gluteal region which are mild to moderate and frequent.  She has been getting more involved in exercise and the rib pain is improving.  She found a gym that does group classes and is asking for my signature to approve her to enter these     HPI - Back pain - Previously saw my colleagues, Americo Cano & Sarah- Patient reports 7/10 NRS left-sided lower back and left gluteal pain. Denies radiation, numbness, tingling. Endorses frequent upper and middle back pain. She reports that she got significant benefit from chiropractic care/dry needling in the past. She stays active by swimming 3x per week. She also wants to start strength training.  Chronic issue for several years. Lost a lot of weight and no longer needs to use a walker since seeing chiropractor.     Review of Systems   Constitutional:  Negative for fever.   Eyes:  Negative for visual disturbance.   Respiratory:  Negative for shortness of breath.    Cardiovascular:  Negative for chest pain. "   Gastrointestinal:  Negative for diarrhea, nausea and vomiting.   Genitourinary:  Negative for difficulty urinating and dysuria.   Skin:  Negative for color change.   Neurological:  Negative for dizziness.   Psychiatric/Behavioral:  Negative for agitation.    All other systems reviewed and are negative.    Objective   Examination findings (e.g., palpation & ROM): Dec LS ROM and CS Rotation with increased pain  HTN/tender LS erectors, L g med, R CS erectors, L upper trapezius, BL suboccipitals  SLR BL 70  Pain L FAIR test    Segmental joint dysfunction was identified in the following areas using motion palpation and/or pain provocation assessment:  Cervical: 2  Thoracic: 5-8  Lumbopelvic: 1-2, SIJ BL    Physical Exam  Neurological:      Mental Status: She is alert.      Sensory: Sensation is intact.      Motor: Motor function is intact.      Coordination: Coordination is intact.      Gait: Gait is intact.      Deep Tendon Reflexes:      Reflex Scores:       Patellar reflexes are 1+ on the right side and 0 on the left side.       Achilles reflexes are 1+ on the right side and 0 on the left side.     Comments: 12/3/24       Plan   Today's treatment:  SMT to regions of segmental dysfunction identified on exam, using age-appropriate force, and manual diversified technique.   STM to patient tolerance to hypertonic paraspinal muscles 1m  Dry needling (10 in, 10 out) to region of the chief complaint / hypertonic muscles identified upon palpation LS erectors, L g med 3m  Manual dynamic stretching of the gluteal muscles, hamstrings, upper trapezius 2m  Patient noted improved mobility and reduced pain post-treatment    Treatment Plan:   The patient and I discussed the risks and benefits of chiropractic care. Based on the patient's subjective complaints along with the examination findings, it is advised that a course of chiropractic treatment be initiated. The patient provided consent for care. The patient tolerated today's  treatment with little or no additional discomfort and was instructed to contact the office for questions or concerns. Will see patient once per week then every 2 weeks when symptoms become mild/manageable, further spaced apart contingent upon improvement.     This chart note was generated using dictation software, and as such, there may be typographical errors present. Abbreviations: Cervical spine (CS), cervical-thoracic (CT), Dry needling (DN), Flexion adduction internal rotation (FAIR), high velocity, low amplitude (HVLA), Lumbar spine (LS), Soft tissue manipulation (STM), spinal manipulative therapy (SMT), Straight leg raise (SLR), Thoracic spine (TS).

## 2025-04-10 ENCOUNTER — ALLIED HEALTH (OUTPATIENT)
Dept: INTEGRATIVE MEDICINE | Facility: CLINIC | Age: 55
End: 2025-04-10
Payer: MEDICAID

## 2025-04-10 ENCOUNTER — APPOINTMENT (OUTPATIENT)
Dept: PRIMARY CARE | Facility: CLINIC | Age: 55
End: 2025-04-10
Payer: MEDICAID

## 2025-04-10 DIAGNOSIS — M99.02 SOMATIC DYSFUNCTION OF THORACIC REGION: Primary | ICD-10-CM

## 2025-04-10 DIAGNOSIS — M17.0 PRIMARY OSTEOARTHRITIS OF BOTH KNEES: ICD-10-CM

## 2025-04-10 DIAGNOSIS — M99.05 SOMATIC DYSFUNCTION OF PELVIS REGION: ICD-10-CM

## 2025-04-10 DIAGNOSIS — M79.10 MYALGIA: ICD-10-CM

## 2025-04-10 DIAGNOSIS — M47.816 LUMBAR SPONDYLOSIS: ICD-10-CM

## 2025-04-10 DIAGNOSIS — Z71.3 DIETARY COUNSELING AND SURVEILLANCE: ICD-10-CM

## 2025-04-10 DIAGNOSIS — M99.03 SOMATIC DYSFUNCTION OF LUMBAR REGION: ICD-10-CM

## 2025-04-10 DIAGNOSIS — E11.9 TYPE 2 DIABETES MELLITUS WITHOUT COMPLICATION, WITHOUT LONG-TERM CURRENT USE OF INSULIN: Primary | ICD-10-CM

## 2025-04-10 DIAGNOSIS — M54.17 LUMBOSACRAL RADICULITIS: ICD-10-CM

## 2025-04-10 DIAGNOSIS — M54.2 NECK PAIN: ICD-10-CM

## 2025-04-10 PROCEDURE — 98941 CHIROPRACT MANJ 3-4 REGIONS: CPT | Performed by: CHIROPRACTOR

## 2025-04-21 ASSESSMENT — ENCOUNTER SYMPTOMS
SHORTNESS OF BREATH: 0
VOMITING: 0
DYSURIA: 0
AGITATION: 0
NAUSEA: 0
DIARRHEA: 0
DIZZINESS: 0
COLOR CHANGE: 0
DIFFICULTY URINATING: 0
FEVER: 0

## 2025-04-22 ENCOUNTER — APPOINTMENT (OUTPATIENT)
Dept: INTEGRATIVE MEDICINE | Facility: CLINIC | Age: 55
End: 2025-04-22
Payer: MEDICAID

## 2025-04-22 DIAGNOSIS — M79.10 MYALGIA: ICD-10-CM

## 2025-04-22 DIAGNOSIS — M99.01 CERVICAL SEGMENT DYSFUNCTION: ICD-10-CM

## 2025-04-22 DIAGNOSIS — M54.2 NECK PAIN: ICD-10-CM

## 2025-04-22 DIAGNOSIS — M99.02 SOMATIC DYSFUNCTION OF THORACIC REGION: Primary | ICD-10-CM

## 2025-04-22 DIAGNOSIS — M54.17 LUMBOSACRAL RADICULITIS: ICD-10-CM

## 2025-04-22 DIAGNOSIS — M99.03 SOMATIC DYSFUNCTION OF LUMBAR REGION: ICD-10-CM

## 2025-04-22 DIAGNOSIS — M47.816 LUMBAR SPONDYLOSIS: ICD-10-CM

## 2025-04-22 DIAGNOSIS — M99.05 SOMATIC DYSFUNCTION OF PELVIS REGION: ICD-10-CM

## 2025-04-22 PROCEDURE — 98941 CHIROPRACT MANJ 3-4 REGIONS: CPT | Performed by: CHIROPRACTOR

## 2025-04-22 NOTE — PROGRESS NOTES
"Assessment/Plan   The patient's presentation is consistent with lumbar spondylosis, lumbosacral radiculitis, and mechanical joint dysfunction. Her condition is complicated by her idiopathic scoliosis. Based on the chronicity of her complaints I do feel that a trial of acupuncture alongside chiropractic care will help optimize the patient's outcome. Will use JEAN FERRERA.     CS XR 2024 - Degenerative changes, as described, most prominent at C4-C5.     TS radiograph 2017 - 35* L thoracic convexity, T5 to T12 (scoliosis)    MRI of lumbar spine from 11/20/2021: \"Mild to moderate rotatory levoconvex scoliosis and mild grade 1  anterolisthesis at L5-S1 associated with multilevel spondylosis most  notably minor disc bulging at L4-5 and moderate to advanced posterior   facet arthropathy at L4-5 and L5-S1 concordant with x-ray lumbar spine  series 09/22/2021.\"     Visits this year: 6    Subjective   Patient reports overall improvement since starting care and also since our last visit currently noting mild pain and tightness in the neck and lower back worse on the left side.  Has returned to regular exercise routine noting that she did a circuit with resistance machines last weekend and this went well.  Notes that she had slight soreness but nothing severe and no severe pain with the workout or afterwards.  No radiating symptoms into the extremities    HPI - Back pain - Previously saw my colleagues, Americo Cano & Sarah- Patient reports 7/10 NRS left-sided lower back and left gluteal pain. Denies radiation, numbness, tingling. Endorses frequent upper and middle back pain. She reports that she got significant benefit from chiropractic care/dry needling in the past. She stays active by swimming 3x per week. She also wants to start strength training.  Chronic issue for several years. Lost a lot of weight and no longer needs to use a walker since seeing chiropractor.     Review of Systems   Constitutional:  Negative for fever. "   Eyes:  Negative for visual disturbance.   Respiratory:  Negative for shortness of breath.    Cardiovascular:  Negative for chest pain.   Gastrointestinal:  Negative for diarrhea, nausea and vomiting.   Genitourinary:  Negative for difficulty urinating and dysuria.   Skin:  Negative for color change.   Neurological:  Negative for dizziness.   Psychiatric/Behavioral:  Negative for agitation.    All other systems reviewed and are negative.    Objective   Examination findings (e.g., palpation & ROM): Dec LS ROM and CS Rotation with increased pain  HTN/tender LS erectors, L g med, R CS erectors, L upper trapezius, BL suboccipitals  SLR BL 70  Pain bl FAIR test    Segmental joint dysfunction was identified in the following areas using motion palpation and/or pain provocation assessment:  Cervical: 2  Thoracic: 5-10  Lumbopelvic: 1-2, SIJ BL    Physical Exam  Neurological:      Mental Status: She is alert.      Sensory: Sensation is intact.      Motor: Motor function is intact.      Coordination: Coordination is intact.      Gait: Gait is intact.      Deep Tendon Reflexes:      Reflex Scores:       Patellar reflexes are 1+ on the right side and 0 on the left side.       Achilles reflexes are 1+ on the right side and 0 on the left side.     Comments: 12/3/24       Plan   Today's treatment:  SMT to regions of segmental dysfunction identified on exam, using age-appropriate force, and manual diversified technique.   STM to patient tolerance to hypertonic paraspinal muscles 1m  Dry needling (10 in, 10 out) to region of the chief complaint / hypertonic muscles identified upon palpation LS erectors, L g med 2m  Manual dynamic stretching of the gluteal muscles, hamstrings, upper trapezius 2m  Patient noted improved mobility and reduced pain post-treatment    Treatment Plan:   The patient and I discussed the risks and benefits of chiropractic care. Based on the patient's subjective complaints along with the examination findings,  it is advised that a course of chiropractic treatment be initiated. The patient provided consent for care. The patient tolerated today's treatment with little or no additional discomfort and was instructed to contact the office for questions or concerns. Will see patient once per week then every 2 weeks when symptoms become mild/manageable, further spaced apart contingent upon improvement.     This chart note was generated using dictation software, and as such, there may be typographical errors present. Abbreviations: Cervical spine (CS), cervical-thoracic (CT), Dry needling (DN), Flexion adduction internal rotation (FAIR), high velocity, low amplitude (HVLA), Lumbar spine (LS), Soft tissue manipulation (STM), spinal manipulative therapy (SMT), Straight leg raise (SLR), Thoracic spine (TS).

## 2025-04-29 PROBLEM — Z91.199 NONCOMPLIANCE: Status: RESOLVED | Noted: 2024-02-07 | Resolved: 2025-04-29

## 2025-04-29 PROBLEM — R11.0 NAUSEA IN ADULT: Status: RESOLVED | Noted: 2023-09-18 | Resolved: 2025-04-29

## 2025-04-29 PROBLEM — J40 BRONCHITIS, NOT SPECIFIED AS ACUTE OR CHRONIC: Status: RESOLVED | Noted: 2024-02-07 | Resolved: 2025-04-29

## 2025-04-29 PROBLEM — N63.0 BREAST MASS IN FEMALE: Status: RESOLVED | Noted: 2023-09-18 | Resolved: 2025-04-29

## 2025-04-29 PROBLEM — F39 UNSPECIFIED MOOD (AFFECTIVE) DISORDER (CMS-HCC): Status: RESOLVED | Noted: 2024-02-07 | Resolved: 2025-04-29

## 2025-04-29 PROBLEM — R10.2 PELVIC PAIN IN FEMALE: Status: RESOLVED | Noted: 2023-09-18 | Resolved: 2025-04-29

## 2025-04-29 PROBLEM — R60.0 PERIPHERAL EDEMA: Status: RESOLVED | Noted: 2023-09-18 | Resolved: 2025-04-29

## 2025-04-29 PROBLEM — R63.2 POLYPHAGIA: Status: RESOLVED | Noted: 2023-09-18 | Resolved: 2025-04-29

## 2025-04-29 PROBLEM — G47.00 INSOMNIA: Status: RESOLVED | Noted: 2023-09-18 | Resolved: 2025-04-29

## 2025-04-29 PROBLEM — K64.4 EXTERNAL HEMORRHOID: Status: RESOLVED | Noted: 2023-09-18 | Resolved: 2025-04-29

## 2025-04-29 PROBLEM — H02.9 LESION OF EYELID: Status: RESOLVED | Noted: 2024-02-07 | Resolved: 2025-04-29

## 2025-04-29 PROBLEM — R32 URINARY INCONTINENCE: Status: RESOLVED | Noted: 2023-09-18 | Resolved: 2025-04-29

## 2025-04-29 PROBLEM — N81.6 RECTOCELE, FEMALE: Status: RESOLVED | Noted: 2023-09-18 | Resolved: 2025-04-29

## 2025-04-29 PROBLEM — F41.9 ANXIETY: Status: RESOLVED | Noted: 2023-09-18 | Resolved: 2025-04-29

## 2025-04-29 PROBLEM — R06.83 SNORING: Status: RESOLVED | Noted: 2024-02-07 | Resolved: 2025-04-29

## 2025-04-29 PROBLEM — K64.4 ANAL SKIN TAG: Status: RESOLVED | Noted: 2023-09-18 | Resolved: 2025-04-29

## 2025-04-30 ENCOUNTER — HOSPITAL ENCOUNTER (OUTPATIENT)
Dept: RADIOLOGY | Facility: HOSPITAL | Age: 55
Discharge: HOME | End: 2025-04-30
Payer: MEDICAID

## 2025-04-30 ENCOUNTER — OFFICE VISIT (OUTPATIENT)
Dept: ORTHOPEDIC SURGERY | Facility: HOSPITAL | Age: 55
End: 2025-04-30
Payer: MEDICAID

## 2025-04-30 VITALS — HEIGHT: 62 IN | BODY MASS INDEX: 45.82 KG/M2 | WEIGHT: 249 LBS

## 2025-04-30 DIAGNOSIS — M17.0 OSTEOARTHRITIS OF BOTH KNEES, UNSPECIFIED OSTEOARTHRITIS TYPE: Primary | ICD-10-CM

## 2025-04-30 DIAGNOSIS — M17.0 OSTEOARTHRITIS OF BOTH KNEES, UNSPECIFIED OSTEOARTHRITIS TYPE: ICD-10-CM

## 2025-04-30 PROCEDURE — 2500000004 HC RX 250 GENERAL PHARMACY W/ HCPCS (ALT 636 FOR OP/ED): Performed by: STUDENT IN AN ORGANIZED HEALTH CARE EDUCATION/TRAINING PROGRAM

## 2025-04-30 PROCEDURE — 73564 X-RAY EXAM KNEE 4 OR MORE: CPT | Mod: 50

## 2025-04-30 PROCEDURE — 99213 OFFICE O/P EST LOW 20 MIN: CPT | Mod: 25 | Performed by: STUDENT IN AN ORGANIZED HEALTH CARE EDUCATION/TRAINING PROGRAM

## 2025-04-30 PROCEDURE — 20610 DRAIN/INJ JOINT/BURSA W/O US: CPT | Mod: RT | Performed by: STUDENT IN AN ORGANIZED HEALTH CARE EDUCATION/TRAINING PROGRAM

## 2025-04-30 PROCEDURE — 20610 DRAIN/INJ JOINT/BURSA W/O US: CPT | Mod: LT | Performed by: STUDENT IN AN ORGANIZED HEALTH CARE EDUCATION/TRAINING PROGRAM

## 2025-04-30 RX ORDER — CALCIUM CITRATE/VITAMIN D3 200MG-6.25
TABLET ORAL
COMMUNITY
Start: 2025-03-28

## 2025-04-30 RX ORDER — NAPROXEN 500 MG/1
TABLET ORAL
COMMUNITY
Start: 2025-04-25

## 2025-04-30 RX ORDER — DIPHENHYDRAMINE HYDROCHLORIDE 25 MG/1
CAPSULE ORAL
COMMUNITY
Start: 2025-04-01

## 2025-04-30 RX ORDER — ALCOHOL ANTISEPTIC PADS
PADS, MEDICATED (EA) TOPICAL
COMMUNITY
Start: 2025-04-25

## 2025-04-30 RX ADMIN — LIDOCAINE HYDROCHLORIDE 8 ML: 10 INJECTION, SOLUTION INFILTRATION; PERINEURAL at 08:28

## 2025-04-30 RX ADMIN — TRIAMCINOLONE ACETONIDE 40 MG: 40 INJECTION, SUSPENSION INTRA-ARTICULAR; INTRAMUSCULAR at 08:28

## 2025-04-30 RX ADMIN — BUPIVACAINE HYDROCHLORIDE 4 ML: 250 INJECTION, SOLUTION PERINEURAL at 08:28

## 2025-05-03 RX ORDER — LIDOCAINE HYDROCHLORIDE 10 MG/ML
8 INJECTION, SOLUTION INFILTRATION; PERINEURAL
Status: COMPLETED | OUTPATIENT
Start: 2025-04-30 | End: 2025-04-30

## 2025-05-03 RX ORDER — BUPIVACAINE HYDROCHLORIDE 5 MG/ML
4 INJECTION, SOLUTION PERINEURAL
Status: COMPLETED | OUTPATIENT
Start: 2025-04-30 | End: 2025-04-30

## 2025-05-03 RX ORDER — TRIAMCINOLONE ACETONIDE 40 MG/ML
40 INJECTION, SUSPENSION INTRA-ARTICULAR; INTRAMUSCULAR
Status: COMPLETED | OUTPATIENT
Start: 2025-04-30 | End: 2025-04-30

## 2025-05-05 ASSESSMENT — ENCOUNTER SYMPTOMS
DIZZINESS: 0
NAUSEA: 0
SHORTNESS OF BREATH: 0
DYSURIA: 0
FEVER: 0
DIARRHEA: 0
DIFFICULTY URINATING: 0
COLOR CHANGE: 0
AGITATION: 0
VOMITING: 0

## 2025-05-06 ENCOUNTER — APPOINTMENT (OUTPATIENT)
Dept: INTEGRATIVE MEDICINE | Facility: CLINIC | Age: 55
End: 2025-05-06
Payer: MEDICAID

## 2025-05-06 DIAGNOSIS — M99.03 SOMATIC DYSFUNCTION OF LUMBAR REGION: ICD-10-CM

## 2025-05-06 DIAGNOSIS — M99.05 SOMATIC DYSFUNCTION OF PELVIS REGION: ICD-10-CM

## 2025-05-06 DIAGNOSIS — M99.01 CERVICAL SEGMENT DYSFUNCTION: ICD-10-CM

## 2025-05-06 DIAGNOSIS — M47.816 LUMBAR SPONDYLOSIS: ICD-10-CM

## 2025-05-06 DIAGNOSIS — M54.2 NECK PAIN: ICD-10-CM

## 2025-05-06 DIAGNOSIS — M54.17 LUMBOSACRAL RADICULITIS: ICD-10-CM

## 2025-05-06 DIAGNOSIS — M79.10 MYALGIA: ICD-10-CM

## 2025-05-06 DIAGNOSIS — M99.02 SOMATIC DYSFUNCTION OF THORACIC REGION: Primary | ICD-10-CM

## 2025-05-06 PROCEDURE — 99213 OFFICE O/P EST LOW 20 MIN: CPT | Performed by: CHIROPRACTOR

## 2025-05-06 PROCEDURE — 98941 CHIROPRACT MANJ 3-4 REGIONS: CPT | Performed by: CHIROPRACTOR

## 2025-05-06 NOTE — PROGRESS NOTES
"Assessment/Plan   The patient's presentation is consistent with lumbar spondylosis, lumbosacral radiculopathy, and mechanical joint dysfunction. Her condition is complicated by her idiopathic scoliosis. Based on the chronicity of her complaints I do feel that a trial of acupuncture alongside chiropractic care will help optimize the patient's outcome. Will use JEAN FERRERA.     CS XR 2024 - Degenerative changes, as described, most prominent at C4-C5.     TS radiograph 2017 - 35* L thoracic convexity, T5 to T12 (scoliosis)    MRI of lumbar spine from 11/20/2021: \"Mild to moderate rotatory levoconvex scoliosis and mild grade 1  anterolisthesis at L5-S1 associated with multilevel spondylosis most  notably minor disc bulging at L4-5 and moderate to advanced posterior   facet arthropathy at L4-5 and L5-S1 concordant with x-ray lumbar spine  series 09/22/2021.\"     Visits this year: 7    Subjective   Patient reports that her neck and back pain are bothering her mostly the lower back pain which is 6 out of 10 intensity and the neck pain is currently mild in intensity.  Low back pain does radiate to the left gluteal region but no further into the lower extremity.  She is going to the gym and exercising regularly doing some strength training and also saw the orthopedist had bilateral knee injections which helped alleviate knee pain.  She is considering more permanent weight loss approaches as she prepares to hopefully get knee replacement surgery eventually.  She staying active also outside in the garden    HPI - Back pain - Previously saw my colleagues, Americo Cano & Sarah- Patient reports 7/10 NRS left-sided lower back and left gluteal pain. Denies radiation, numbness, tingling. Endorses frequent upper and middle back pain. She reports that she got significant benefit from chiropractic care/dry needling in the past. She stays active by swimming 3x per week. She also wants to start strength training.  Chronic issue for " several years. Lost a lot of weight and no longer needs to use a walker since seeing chiropractor.     Review of Systems   Constitutional:  Negative for fever.   Eyes:  Negative for visual disturbance.   Respiratory:  Negative for shortness of breath.    Cardiovascular:  Negative for chest pain.   Gastrointestinal:  Negative for diarrhea, nausea and vomiting.   Genitourinary:  Negative for difficulty urinating and dysuria.   Skin:  Negative for color change.   Neurological:  Negative for dizziness.   Psychiatric/Behavioral:  Negative for agitation.    All other systems reviewed and are negative.    Objective   Examination findings (e.g., palpation & ROM): Dec LS ROM and CS Rotation with increased pain  HTN/tender LS erectors, L g med, R CS erectors, L upper trapezius, BL suboccipitals  SLR BL 70  Pain L FAIR test    Segmental joint dysfunction was identified in the following areas using motion palpation and/or pain provocation assessment:  Cervical: 2  Thoracic: 5-10  Lumbopelvic: 1-2, SIJ BL    Physical Exam  Neurological:      Mental Status: She is alert.      Sensory: Sensation is intact.      Motor: Motor function is intact.      Coordination: Coordination is intact.      Gait: Gait is intact.      Deep Tendon Reflexes:      Reflex Scores:       Patellar reflexes are 2+ on the right side and 1+ on the left side.       Achilles reflexes are 2+ on the right side and 1+ on the left side.     Comments: 5/6/2025       Plan   Today's treatment:  SMT to regions of segmental dysfunction identified on exam, using age-appropriate force, and manual diversified technique.   STM to patient tolerance to hypertonic paraspinal muscles 1m  Dry needling (10 in, 10 out) to region of the chief complaint / hypertonic muscles identified upon palpation LS erectors, L g med 2m  Manual dynamic stretching of the gluteal muscles, hamstrings, upper trapezius 2m  Patient noted improved mobility and reduced pain post-treatment    Treatment  Plan:   The patient and I discussed the risks and benefits of chiropractic care. Based on the patient's subjective complaints along with the examination findings, it is advised that a course of chiropractic treatment be initiated. The patient provided consent for care. The patient tolerated today's treatment with little or no additional discomfort and was instructed to contact the office for questions or concerns. Will see patient once per week then every 2 weeks when symptoms become mild/manageable, further spaced apart contingent upon improvement.     This chart note was generated using dictation software, and as such, there may be typographical errors present. Abbreviations: Cervical spine (CS), cervical-thoracic (CT), Dry needling (DN), Flexion adduction internal rotation (FAIR), high velocity, low amplitude (HVLA), Lumbar spine (LS), Soft tissue manipulation (STM), spinal manipulative therapy (SMT), Straight leg raise (SLR), Thoracic spine (TS).

## 2025-05-19 ASSESSMENT — ENCOUNTER SYMPTOMS
COLOR CHANGE: 0
NAUSEA: 0
FEVER: 0
AGITATION: 0
DYSURIA: 0
VOMITING: 0
SHORTNESS OF BREATH: 0
DIFFICULTY URINATING: 0
DIZZINESS: 0
DIARRHEA: 0

## 2025-05-19 NOTE — PROGRESS NOTES
"Assessment/Plan   The patient's presentation is consistent with lumbar spondylosis, lumbosacral radiculopathy, and mechanical joint dysfunction. Her condition is complicated by her idiopathic scoliosis. Based on the chronicity of her complaints I do feel that a trial of acupuncture alongside chiropractic care will help optimize the patient's outcome. Will use JEAN FERRERA.     CS XR 2024 - Degenerative changes, as described, most prominent at C4-C5.     TS radiograph 2017 - 35* L thoracic convexity, T5 to T12 (scoliosis)    MRI of lumbar spine from 11/20/2021: \"Mild to moderate rotatory levoconvex scoliosis and mild grade 1  anterolisthesis at L5-S1 associated with multilevel spondylosis most  notably minor disc bulging at L4-5 and moderate to advanced posterior facet arthropathy at L4-5 and L5-S1 concordant with x-ray lumbar spine  series 09/22/2021.\"     Visits this year: 8    Subjective   Patient reports anterior left hip soreness due to gardening. She thinks lifting a heavy bag of soil may have provoked the hip pain, she rates it 6/10 today and feels she is limping. Neck and lower back pain have improved overall. Has been swimming & lifting weights. No radiating symptoms.     HPI - Back pain - Previously saw my colleagues, Americo Cano & Sarah- Patient reports 7/10 NRS left-sided lower back and left gluteal pain. Denies radiation, numbness, tingling. Endorses frequent upper and middle back pain. She reports that she got significant benefit from chiropractic care/dry needling in the past. She stays active by swimming 3x per week. She also wants to start strength training.  Chronic issue for several years. Lost a lot of weight and no longer needs to use a walker since seeing chiropractor.     Review of Systems   Constitutional:  Negative for fever.   Eyes:  Negative for visual disturbance.   Respiratory:  Negative for shortness of breath.    Cardiovascular:  Negative for chest pain.   Gastrointestinal:  Negative for " diarrhea, nausea and vomiting.   Genitourinary:  Negative for difficulty urinating and dysuria.   Skin:  Negative for color change.   Neurological:  Negative for dizziness.   Psychiatric/Behavioral:  Negative for agitation.    All other systems reviewed and are negative.    Objective   Examination findings (e.g., palpation & ROM): Dec LS ROM and CS Rotation with increased pain  HTN/tender LS erectors, L g med, R CS erectors, L upper trapezius, BL suboccipitals  SLR BL 70  Pain L FAIR test    Segmental joint dysfunction was identified in the following areas using motion palpation and/or pain provocation assessment:  Cervical: 2  Thoracic: 5-7  Lumbopelvic: 1-2, SIJ BL    Physical Exam  Neurological:      Mental Status: She is alert.      Sensory: Sensation is intact.      Motor: Motor function is intact.      Coordination: Coordination is intact.      Gait: Gait is intact.      Deep Tendon Reflexes:      Reflex Scores:       Patellar reflexes are 2+ on the right side and 1+ on the left side.       Achilles reflexes are 2+ on the right side and 1+ on the left side.     Comments: 5/6/2025     Plan   Today's treatment:  SMT to regions of segmental dysfunction identified on exam, using age-appropriate force, and manual diversified technique.   STM to patient tolerance to hypertonic paraspinal muscles 1m  Dry needling (10 in, 10 out) to region of the chief complaint / hypertonic muscles identified upon palpation LS erectors, L g med 2m  Manual dynamic stretching of the gluteal muscles, hamstrings, upper trapezius 2m  Patient noted improved mobility and reduced pain post-treatment    Treatment Plan:   The patient and I discussed the risks and benefits of chiropractic care. Based on the patient's subjective complaints along with the examination findings, it is advised that a course of chiropractic treatment be initiated. The patient provided consent for care. The patient tolerated today's treatment with little or no  additional discomfort and was instructed to contact the office for questions or concerns. Will see patient once per week then every 2 weeks when symptoms become mild/manageable, further spaced apart contingent upon improvement.     This chart note was generated using dictation software, and as such, there may be typographical errors present. Abbreviations: Cervical spine (CS), cervical-thoracic (CT), Dry needling (DN), Flexion adduction internal rotation (FAIR), high velocity, low amplitude (HVLA), Lumbar spine (LS), Soft tissue manipulation (STM), spinal manipulative therapy (SMT), Straight leg raise (SLR), Thoracic spine (TS).

## 2025-05-20 ENCOUNTER — APPOINTMENT (OUTPATIENT)
Dept: INTEGRATIVE MEDICINE | Facility: CLINIC | Age: 55
End: 2025-05-20
Payer: MEDICAID

## 2025-05-20 DIAGNOSIS — M54.2 NECK PAIN: ICD-10-CM

## 2025-05-20 DIAGNOSIS — M79.10 MYALGIA: ICD-10-CM

## 2025-05-20 DIAGNOSIS — M99.03 SOMATIC DYSFUNCTION OF LUMBAR REGION: ICD-10-CM

## 2025-05-20 DIAGNOSIS — M99.02 SOMATIC DYSFUNCTION OF THORACIC REGION: Primary | ICD-10-CM

## 2025-05-20 DIAGNOSIS — M99.05 SOMATIC DYSFUNCTION OF PELVIS REGION: ICD-10-CM

## 2025-05-20 DIAGNOSIS — M54.17 LUMBOSACRAL RADICULITIS: ICD-10-CM

## 2025-05-20 DIAGNOSIS — M47.816 LUMBAR SPONDYLOSIS: ICD-10-CM

## 2025-05-20 PROCEDURE — 98941 CHIROPRACT MANJ 3-4 REGIONS: CPT | Performed by: CHIROPRACTOR

## 2025-06-04 ASSESSMENT — ENCOUNTER SYMPTOMS
DIFFICULTY URINATING: 0
DYSURIA: 0
FEVER: 0
VOMITING: 0
AGITATION: 0
COLOR CHANGE: 0
DIARRHEA: 0
SHORTNESS OF BREATH: 0
NAUSEA: 0
DIZZINESS: 0
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
## 2025-06-05 ENCOUNTER — APPOINTMENT (OUTPATIENT)
Dept: INTEGRATIVE MEDICINE | Facility: CLINIC | Age: 55
End: 2025-06-05
Payer: MEDICAID

## 2025-06-05 DIAGNOSIS — M54.2 NECK PAIN: ICD-10-CM

## 2025-06-05 DIAGNOSIS — M99.05 SOMATIC DYSFUNCTION OF PELVIS REGION: ICD-10-CM

## 2025-06-05 DIAGNOSIS — M99.03 SOMATIC DYSFUNCTION OF LUMBAR REGION: ICD-10-CM

## 2025-06-05 DIAGNOSIS — M54.17 LUMBOSACRAL RADICULITIS: ICD-10-CM

## 2025-06-05 DIAGNOSIS — M99.02 SOMATIC DYSFUNCTION OF THORACIC REGION: Primary | ICD-10-CM

## 2025-06-05 DIAGNOSIS — M99.01 CERVICAL SEGMENT DYSFUNCTION: ICD-10-CM

## 2025-06-05 DIAGNOSIS — M47.816 LUMBAR SPONDYLOSIS: ICD-10-CM

## 2025-06-05 DIAGNOSIS — M79.10 MYALGIA: ICD-10-CM

## 2025-06-05 PROCEDURE — 99213 OFFICE O/P EST LOW 20 MIN: CPT | Performed by: CHIROPRACTOR

## 2025-06-05 PROCEDURE — 98941 CHIROPRACT MANJ 3-4 REGIONS: CPT | Performed by: CHIROPRACTOR

## 2025-06-23 ASSESSMENT — ENCOUNTER SYMPTOMS
VOMITING: 0
DIZZINESS: 0
AGITATION: 0
DYSURIA: 0
DIFFICULTY URINATING: 0
SHORTNESS OF BREATH: 0
COLOR CHANGE: 0
FEVER: 0
DIARRHEA: 0
NAUSEA: 0
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
## 2025-06-24 ENCOUNTER — APPOINTMENT (OUTPATIENT)
Dept: INTEGRATIVE MEDICINE | Facility: CLINIC | Age: 55
End: 2025-06-24
Payer: MEDICAID

## 2025-06-24 DIAGNOSIS — M54.2 NECK PAIN: ICD-10-CM

## 2025-06-24 DIAGNOSIS — M99.05 SOMATIC DYSFUNCTION OF PELVIS REGION: ICD-10-CM

## 2025-06-24 DIAGNOSIS — M47.816 LUMBAR SPONDYLOSIS: ICD-10-CM

## 2025-06-24 DIAGNOSIS — M79.10 MYALGIA: ICD-10-CM

## 2025-06-24 DIAGNOSIS — M99.01 CERVICAL SEGMENT DYSFUNCTION: ICD-10-CM

## 2025-06-24 DIAGNOSIS — M54.17 LUMBOSACRAL RADICULITIS: ICD-10-CM

## 2025-06-24 DIAGNOSIS — M99.03 SOMATIC DYSFUNCTION OF LUMBAR REGION: ICD-10-CM

## 2025-06-24 DIAGNOSIS — M99.02 SOMATIC DYSFUNCTION OF THORACIC REGION: Primary | ICD-10-CM

## 2025-06-24 PROCEDURE — 98941 CHIROPRACT MANJ 3-4 REGIONS: CPT | Performed by: CHIROPRACTOR

## 2025-07-01 ENCOUNTER — APPOINTMENT (OUTPATIENT)
Dept: INTEGRATIVE MEDICINE | Facility: CLINIC | Age: 55
End: 2025-07-01
Payer: MEDICAID

## 2025-07-07 ASSESSMENT — ENCOUNTER SYMPTOMS
DYSURIA: 0
DIFFICULTY URINATING: 0
AGITATION: 0
SHORTNESS OF BREATH: 0
DIZZINESS: 0
NAUSEA: 0
VOMITING: 0
COLOR CHANGE: 0
FEVER: 0
DIARRHEA: 0
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
## 2025-07-08 ENCOUNTER — APPOINTMENT (OUTPATIENT)
Dept: INTEGRATIVE MEDICINE | Facility: CLINIC | Age: 55
End: 2025-07-08
Payer: MEDICAID

## 2025-07-08 DIAGNOSIS — M99.02 SOMATIC DYSFUNCTION OF THORACIC REGION: Primary | ICD-10-CM

## 2025-07-08 DIAGNOSIS — M99.05 SOMATIC DYSFUNCTION OF PELVIS REGION: ICD-10-CM

## 2025-07-08 DIAGNOSIS — M79.10 MYALGIA: ICD-10-CM

## 2025-07-08 DIAGNOSIS — M54.2 NECK PAIN: ICD-10-CM

## 2025-07-08 DIAGNOSIS — M99.03 SOMATIC DYSFUNCTION OF LUMBAR REGION: ICD-10-CM

## 2025-07-08 DIAGNOSIS — M47.816 LUMBAR SPONDYLOSIS: ICD-10-CM

## 2025-07-08 DIAGNOSIS — M54.17 LUMBOSACRAL RADICULITIS: ICD-10-CM

## 2025-07-08 PROCEDURE — 98941 CHIROPRACT MANJ 3-4 REGIONS: CPT | Performed by: CHIROPRACTOR

## 2025-07-15 ENCOUNTER — APPOINTMENT (OUTPATIENT)
Dept: INTEGRATIVE MEDICINE | Facility: CLINIC | Age: 55
End: 2025-07-15
Payer: MEDICAID

## 2025-07-21 ASSESSMENT — ENCOUNTER SYMPTOMS
SHORTNESS OF BREATH: 0
DIARRHEA: 0
COLOR CHANGE: 0
DIFFICULTY URINATING: 0
VOMITING: 0
NAUSEA: 0
DYSURIA: 0
FEVER: 0
AGITATION: 0
DIZZINESS: 0
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
## 2025-07-22 ENCOUNTER — APPOINTMENT (OUTPATIENT)
Dept: INTEGRATIVE MEDICINE | Facility: CLINIC | Age: 55
End: 2025-07-22
Payer: MEDICAID

## 2025-07-22 DIAGNOSIS — M79.10 MYALGIA: ICD-10-CM

## 2025-07-22 DIAGNOSIS — M54.17 LUMBOSACRAL RADICULITIS: ICD-10-CM

## 2025-07-22 DIAGNOSIS — M99.02 SOMATIC DYSFUNCTION OF THORACIC REGION: Primary | ICD-10-CM

## 2025-07-22 DIAGNOSIS — M47.816 LUMBAR SPONDYLOSIS: ICD-10-CM

## 2025-07-22 DIAGNOSIS — M99.03 SOMATIC DYSFUNCTION OF LUMBAR REGION: ICD-10-CM

## 2025-07-22 DIAGNOSIS — M54.2 NECK PAIN: ICD-10-CM

## 2025-07-22 PROCEDURE — 98941 CHIROPRACT MANJ 3-4 REGIONS: CPT | Performed by: CHIROPRACTOR

## 2025-07-28 ASSESSMENT — ENCOUNTER SYMPTOMS
DIZZINESS: 0
FEVER: 0
VOMITING: 0
DIARRHEA: 0
COLOR CHANGE: 0
NAUSEA: 0
SHORTNESS OF BREATH: 0
AGITATION: 0
DYSURIA: 0
DIFFICULTY URINATING: 0
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
## 2025-07-29 ENCOUNTER — APPOINTMENT (OUTPATIENT)
Dept: INTEGRATIVE MEDICINE | Facility: CLINIC | Age: 55
End: 2025-07-29
Payer: MEDICAID

## 2025-07-29 DIAGNOSIS — M54.17 LUMBOSACRAL RADICULITIS: ICD-10-CM

## 2025-07-29 DIAGNOSIS — M99.03 SOMATIC DYSFUNCTION OF LUMBAR REGION: ICD-10-CM

## 2025-07-29 DIAGNOSIS — M54.2 NECK PAIN: ICD-10-CM

## 2025-07-29 DIAGNOSIS — M99.05 SOMATIC DYSFUNCTION OF PELVIS REGION: ICD-10-CM

## 2025-07-29 DIAGNOSIS — M99.02 SOMATIC DYSFUNCTION OF THORACIC REGION: Primary | ICD-10-CM

## 2025-07-29 DIAGNOSIS — M79.10 MYALGIA: ICD-10-CM

## 2025-07-29 DIAGNOSIS — M47.816 LUMBAR SPONDYLOSIS: ICD-10-CM

## 2025-07-29 PROCEDURE — 98941 CHIROPRACT MANJ 3-4 REGIONS: CPT | Performed by: CHIROPRACTOR
